# Patient Record
Sex: MALE | Race: OTHER | NOT HISPANIC OR LATINO | URBAN - METROPOLITAN AREA
[De-identification: names, ages, dates, MRNs, and addresses within clinical notes are randomized per-mention and may not be internally consistent; named-entity substitution may affect disease eponyms.]

---

## 2019-01-14 ENCOUNTER — OUTPATIENT (OUTPATIENT)
Dept: OUTPATIENT SERVICES | Facility: HOSPITAL | Age: 64
LOS: 1 days | Discharge: HOME | End: 2019-01-14
Payer: COMMERCIAL

## 2019-01-14 PROCEDURE — 93971 EXTREMITY STUDY: CPT | Mod: 26

## 2019-01-23 DIAGNOSIS — M79.601 PAIN IN RIGHT ARM: ICD-10-CM

## 2021-07-12 ENCOUNTER — EMERGENCY (EMERGENCY)
Facility: HOSPITAL | Age: 66
LOS: 0 days | Discharge: HOME | End: 2021-07-12
Attending: EMERGENCY MEDICINE | Admitting: EMERGENCY MEDICINE
Payer: MEDICARE

## 2021-07-12 VITALS
SYSTOLIC BLOOD PRESSURE: 138 MMHG | DIASTOLIC BLOOD PRESSURE: 64 MMHG | RESPIRATION RATE: 18 BRPM | OXYGEN SATURATION: 97 % | HEART RATE: 67 BPM | TEMPERATURE: 98 F | HEIGHT: 70 IN | WEIGHT: 257.94 LBS

## 2021-07-12 DIAGNOSIS — E78.5 HYPERLIPIDEMIA, UNSPECIFIED: ICD-10-CM

## 2021-07-12 DIAGNOSIS — M54.5 LOW BACK PAIN: ICD-10-CM
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
## 2021-07-12 DIAGNOSIS — E11.9 TYPE 2 DIABETES MELLITUS WITHOUT COMPLICATIONS: ICD-10-CM

## 2021-07-12 DIAGNOSIS — I10 ESSENTIAL (PRIMARY) HYPERTENSION: ICD-10-CM
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
## 2021-07-12 DIAGNOSIS — Z87.891 PERSONAL HISTORY OF NICOTINE DEPENDENCE: ICD-10-CM

## 2021-07-12 DIAGNOSIS — R30.0 DYSURIA: ICD-10-CM

## 2021-07-12 LAB
APPEARANCE UR: CLEAR — SIGNIFICANT CHANGE UP
BACTERIA # UR AUTO: NEGATIVE — SIGNIFICANT CHANGE UP
BILIRUB UR-MCNC: NEGATIVE — SIGNIFICANT CHANGE UP
COLOR SPEC: YELLOW — SIGNIFICANT CHANGE UP
DIFF PNL FLD: NEGATIVE — SIGNIFICANT CHANGE UP
EPI CELLS # UR: 4 /HPF — SIGNIFICANT CHANGE UP (ref 0–5)
GLUCOSE UR QL: SIGNIFICANT CHANGE UP
HYALINE CASTS # UR AUTO: 1 /LPF — SIGNIFICANT CHANGE UP (ref 0–7)
KETONES UR-MCNC: NEGATIVE — SIGNIFICANT CHANGE UP
LEUKOCYTE ESTERASE UR-ACNC: NEGATIVE — SIGNIFICANT CHANGE UP
NITRITE UR-MCNC: NEGATIVE — SIGNIFICANT CHANGE UP
PH UR: 6 — SIGNIFICANT CHANGE UP (ref 5–8)
PROT UR-MCNC: ABNORMAL
RBC CASTS # UR COMP ASSIST: 2 /HPF — SIGNIFICANT CHANGE UP (ref 0–4)
SP GR SPEC: 1.04 — HIGH (ref 1.01–1.03)
UROBILINOGEN FLD QL: SIGNIFICANT CHANGE UP
WBC UR QL: 4 /HPF — SIGNIFICANT CHANGE UP (ref 0–5)

## 2021-07-12 PROCEDURE — 99284 EMERGENCY DEPT VISIT MOD MDM: CPT

## 2021-07-12 RX ORDER — METHOCARBAMOL 500 MG/1
1500 TABLET, FILM COATED ORAL ONCE
Refills: 0 | Status: COMPLETED | OUTPATIENT
Start: 2021-07-12 | End: 2021-07-12

## 2021-07-12 RX ORDER — ACETAMINOPHEN 500 MG
650 TABLET ORAL ONCE
Refills: 0 | Status: COMPLETED | OUTPATIENT
Start: 2021-07-12 | End: 2021-07-12

## 2021-07-12 RX ORDER — METHOCARBAMOL 500 MG/1
2 TABLET, FILM COATED ORAL
Qty: 18 | Refills: 0
Start: 2021-07-12 | End: 2021-07-14

## 2021-07-12 RX ADMIN — Medication 650 MILLIGRAM(S): at 16:07

## 2021-07-12 RX ADMIN — METHOCARBAMOL 1500 MILLIGRAM(S): 500 TABLET, FILM COATED ORAL at 16:07

## 2021-07-12 NOTE — ED ADULT TRIAGE NOTE - CHIEF COMPLAINT QUOTE
pt states 1 week ago he developed right lower back pain. states he lifted 2 flower pots. has been having difficulty sleeping from the pain. does not radiate to hip or leg.

## 2021-07-12 NOTE — ED PROVIDER NOTE - PROGRESS NOTE DETAILS
DOROTHY: Patient reports improvement in pain. Results discussed with patient, printed copies given. Will give ortho f/u. Patient agreeable and verbalizes understanding of plan of care, f/u, and return precautions.

## 2021-07-12 NOTE — ED PROVIDER NOTE - PATIENT PORTAL LINK FT
You can access the FollowMyHealth Patient Portal offered by [facility] by registering at the following website: [email or web address]. By joining [employer]’s FollowMyHealth portal, you will also be able to view your health information using other applications (apps) compatible with our system.

## 2021-07-12 NOTE — ED PROVIDER NOTE - NSFOLLOWUPCLINICS_GEN_ALL_ED_FT
[facility] ([facility])  Rehabilitation  [facility] [address], [address] [place] [address]  Phone: [phone]  Fax:   Follow Up Time: 1-3 Days

## 2021-07-12 NOTE — ED PROVIDER NOTE - PHYSICAL EXAMINATION
VITALS:  I have reviewed the initial vital signs.  GENERAL: Well-developed, well-nourished, in no acute distress. Nontoxic. Son at bedside.  HEENT: Sclera clear. No conjunctival pallor. EOMI, PERRLA. MMM.   NECK: supple w FROM.   CARDIO: RRR, nl S1 and S2. No murmurs, rubs, or gallops.  PULM: Normal effort. CTA b/l without wheezes, rales, or rhonchi.  MSK: Normal, steady gait. No midline c, t, l spine ttp. +right SI joint ttp, no swelling, erythema, crepitus, or ecchymosis.  GI: Normal bowel sounds. Abdomen soft and non-distended. Nontender in all four quadrants without rebound or guarding.   : No CVA tenderness b/l.  SKIN: Warm, dry. No pallor. No rash. No jaundice.   NEURO: A&Ox3. Speech clear. No focal deficits.  PSYCH: Calm and cooperative.

## 2021-07-12 NOTE — ED PROVIDER NOTE - NS ED ROS FT
CONSTITUTIONAL: (-) fevers, (-) chills  ENT: (-) congestion, (-) rhinorrhea, (-) sore throat  NECK: (-) neck pain, (-) neck stiffness  CARDIO: (-) chest pain, (-) palpitations, (-) edema  PULM: (-) cough, (-) sputum, (-) chest tightness, (-) shortness of breath  GI: (-) nausea, (-) vomiting, (-) diarrhea, (-) constipation, (-) abdominal pain, (-) bowel incontinence/retention  : (-) dysuria, (-) hematuria, (-) frequency, (-) urgency, (-) flank pain, (-) incontinence, (-) difficulty urinating, (-) urinary retention  MSK: (+) back pain, (-) myalgias, (-) gait difficulty  SKIN: (-) rashes, (-) pallor, (-) itching, (-) wounds, (-) ecchymosis, (-) jaundice  NEURO: (-) weakness, (-) paresthesias, (-) numbness    *all other systems negative except as documented above and in the HPI*

## 2021-07-12 NOTE — ED PROVIDER NOTE - OBJECTIVE STATEMENT
66 year old male w hx of DM, HTN, HLD presents to the ED with constant mild non-radiating right lower back pain x 1 week. Pain began while he was lifting heavy flower pots filled with soil. Pain is worse when moving at the back, not improved with Advil. Also had one episode of burning urination. Denies fevers/chills, abd pain, n/v/d, constipation, hematuria, frequency, black/bloody stools, chest pain, sob, bowel or bladder incontinence/retention, lower extremity weakness/numbness, or saddle paresthesias.

## 2021-07-12 NOTE — ED PROVIDER NOTE - ATTENDING CONTRIBUTION TO CARE
65 y/o male h/o HTN, HLD, DM p/w rt lower back pain x approx 1 week, constant, worse with certain movements and position, better with rest, reports felt warm at onset with no documented fever, chills, paresthesias, focal weakness, bowel or bladder dysfunction, urinary sx, LE weakness, saddle anesthesia, or other associated complaints at present.     No old chart available for review.  I have reviewed and agree with the initial nursing note, except as documented in my note.    VSS, awake, alert in NAD, chest CTAB, +S1/S2, RRR, abdomen soft, NT, no pulsatile masses or bruits appreciated, no midline spinal tenderness, step-offs or deformities, no erythema, swelling or ecchymosis, no skin rash or lesions, able to dorsiflex b/l great toe, motor and sensation intact b/l LE and equal, NV intact, normal gait.

## 2021-07-12 NOTE — ED PROVIDER NOTE - CARE PROVIDER_API CALL
Blake Velazquez (MD)  Orthopaedic Surgery  [address] [place] [address]  Phone: [phone]  Fax: [phone]  Follow Up Time: 1-3 Days

## 2021-07-14 LAB
CULTURE RESULTS: SIGNIFICANT CHANGE UP
SPECIMEN SOURCE: SIGNIFICANT CHANGE UP

## 2021-10-26 ENCOUNTER — EMERGENCY (EMERGENCY)
Facility: HOSPITAL | Age: 66
LOS: 0 days | Discharge: HOME | End: 2021-10-27
Attending: EMERGENCY MEDICINE | Admitting: EMERGENCY MEDICINE
Payer: MEDICARE

## 2021-10-26 VITALS
SYSTOLIC BLOOD PRESSURE: 162 MMHG | OXYGEN SATURATION: 98 % | RESPIRATION RATE: 18 BRPM | TEMPERATURE: 97 F | HEIGHT: 70 IN | HEART RATE: 65 BPM | DIASTOLIC BLOOD PRESSURE: 74 MMHG

## 2021-10-26 DIAGNOSIS — I73.9 PERIPHERAL VASCULAR DISEASE, UNSPECIFIED: ICD-10-CM

## 2021-10-26 DIAGNOSIS — E11.9 TYPE 2 DIABETES MELLITUS WITHOUT COMPLICATIONS: ICD-10-CM

## 2021-10-26 DIAGNOSIS — N39.0 URINARY TRACT INFECTION, SITE NOT SPECIFIED: ICD-10-CM

## 2021-10-26 DIAGNOSIS — I10 ESSENTIAL (PRIMARY) HYPERTENSION: ICD-10-CM

## 2021-10-26 DIAGNOSIS — Z90.49 ACQUIRED ABSENCE OF OTHER SPECIFIED PARTS OF DIGESTIVE TRACT: ICD-10-CM

## 2021-10-26 DIAGNOSIS — N43.3 HYDROCELE, UNSPECIFIED: ICD-10-CM

## 2021-10-26 DIAGNOSIS — R93.3 ABNORMAL FINDINGS ON DIAGNOSTIC IMAGING OF OTHER PARTS OF DIGESTIVE TRACT: ICD-10-CM

## 2021-10-26 DIAGNOSIS — I25.10 ATHEROSCLEROTIC HEART DISEASE OF NATIVE CORONARY ARTERY WITHOUT ANGINA PECTORIS: ICD-10-CM

## 2021-10-26 DIAGNOSIS — K40.90 UNILATERAL INGUINAL HERNIA, WITHOUT OBSTRUCTION OR GANGRENE, NOT SPECIFIED AS RECURRENT: ICD-10-CM

## 2021-10-26 DIAGNOSIS — R10.31 RIGHT LOWER QUADRANT PAIN: ICD-10-CM

## 2021-10-26 LAB
ALBUMIN SERPL ELPH-MCNC: 4.4 G/DL — SIGNIFICANT CHANGE UP (ref 3.5–5.2)
ALP SERPL-CCNC: 90 U/L — SIGNIFICANT CHANGE UP (ref 30–115)
ALT FLD-CCNC: 26 U/L — SIGNIFICANT CHANGE UP (ref 0–41)
ANION GAP SERPL CALC-SCNC: 12 MMOL/L — SIGNIFICANT CHANGE UP (ref 7–14)
APPEARANCE UR: ABNORMAL
AST SERPL-CCNC: 19 U/L — SIGNIFICANT CHANGE UP (ref 0–41)
BACTERIA # UR AUTO: ABNORMAL
BILIRUB SERPL-MCNC: 0.3 MG/DL — SIGNIFICANT CHANGE UP (ref 0.2–1.2)
BILIRUB UR-MCNC: ABNORMAL
BUN SERPL-MCNC: 16 MG/DL — SIGNIFICANT CHANGE UP (ref 10–20)
CALCIUM SERPL-MCNC: 9.6 MG/DL — SIGNIFICANT CHANGE UP (ref 8.5–10.1)
CHLORIDE SERPL-SCNC: 102 MMOL/L — SIGNIFICANT CHANGE UP (ref 98–110)
CO2 SERPL-SCNC: 26 MMOL/L — SIGNIFICANT CHANGE UP (ref 17–32)
COLOR SPEC: YELLOW — SIGNIFICANT CHANGE UP
CREAT SERPL-MCNC: 0.8 MG/DL — SIGNIFICANT CHANGE UP (ref 0.7–1.5)
DIFF PNL FLD: NEGATIVE — SIGNIFICANT CHANGE UP
EPI CELLS # UR: 11 /HPF — HIGH (ref 0–5)
GLUCOSE SERPL-MCNC: 177 MG/DL — HIGH (ref 70–99)
GLUCOSE UR QL: SIGNIFICANT CHANGE UP
HCT VFR BLD CALC: 40.7 % — LOW (ref 42–52)
HGB BLD-MCNC: 13.5 G/DL — LOW (ref 14–18)
HYALINE CASTS # UR AUTO: 10 /LPF — HIGH (ref 0–7)
KETONES UR-MCNC: SIGNIFICANT CHANGE UP
LACTATE SERPL-SCNC: 2.3 MMOL/L — HIGH (ref 0.7–2)
LEUKOCYTE ESTERASE UR-ACNC: ABNORMAL
MCHC RBC-ENTMCNC: 29.8 PG — SIGNIFICANT CHANGE UP (ref 27–31)
MCHC RBC-ENTMCNC: 33.2 G/DL — SIGNIFICANT CHANGE UP (ref 32–37)
MCV RBC AUTO: 89.8 FL — SIGNIFICANT CHANGE UP (ref 80–94)
NITRITE UR-MCNC: NEGATIVE — SIGNIFICANT CHANGE UP
NRBC # BLD: 0 /100 WBCS — SIGNIFICANT CHANGE UP (ref 0–0)
PH UR: 5.5 — SIGNIFICANT CHANGE UP (ref 5–8)
PLATELET # BLD AUTO: 192 K/UL — SIGNIFICANT CHANGE UP (ref 130–400)
POTASSIUM SERPL-MCNC: 4.7 MMOL/L — SIGNIFICANT CHANGE UP (ref 3.5–5)
POTASSIUM SERPL-SCNC: 4.7 MMOL/L — SIGNIFICANT CHANGE UP (ref 3.5–5)
PROT SERPL-MCNC: 7.3 G/DL — SIGNIFICANT CHANGE UP (ref 6–8)
PROT UR-MCNC: ABNORMAL
RBC # BLD: 4.53 M/UL — LOW (ref 4.7–6.1)
RBC # FLD: 13.2 % — SIGNIFICANT CHANGE UP (ref 11.5–14.5)
RBC CASTS # UR COMP ASSIST: 2 /HPF — SIGNIFICANT CHANGE UP (ref 0–4)
SODIUM SERPL-SCNC: 140 MMOL/L — SIGNIFICANT CHANGE UP (ref 135–146)
SP GR SPEC: 1.03 — HIGH (ref 1.01–1.03)
UROBILINOGEN FLD QL: ABNORMAL
WBC # BLD: 7.48 K/UL — SIGNIFICANT CHANGE UP (ref 4.8–10.8)
WBC # FLD AUTO: 7.48 K/UL — SIGNIFICANT CHANGE UP (ref 4.8–10.8)
WBC UR QL: 8 /HPF — HIGH (ref 0–5)

## 2021-10-26 PROCEDURE — 76870 US EXAM SCROTUM: CPT | Mod: 26

## 2021-10-26 PROCEDURE — 74177 CT ABD & PELVIS W/CONTRAST: CPT | Mod: 26,MA

## 2021-10-26 PROCEDURE — 99285 EMERGENCY DEPT VISIT HI MDM: CPT

## 2021-10-26 RX ORDER — IBUPROFEN 200 MG
600 TABLET ORAL ONCE
Refills: 0 | Status: COMPLETED | OUTPATIENT
Start: 2021-10-26 | End: 2021-10-26

## 2021-10-26 RX ADMIN — Medication 600 MILLIGRAM(S): at 16:41

## 2021-10-26 NOTE — ED PROVIDER NOTE - ATTENDING CONTRIBUTION TO CARE
65 yo M pm hof CAD, PVD, DM, HTN, apendectomy, right inguinal hernia repair presents with scrotal pain. States that for the last few days he has been having scrotal pain that worsened today. Pain worse with walking. no abdominal pain. no n/v/d, no urinary symptoms. no fevers. no similar episodes in the past. pmd is in [place].     CONSTITUTIONAL: Well-developed; well-nourished; in no acute distress.   SKIN: warm, dry  HEAD: Normocephalic; atraumatic.  EYES: PERRL, EOMI, no conjunctival erythema  ENT: No nasal discharge; airway clear.  NECK: Supple; non tender.  CARD: S1, S2 normal;  Regular rate and rhythm.   RESP: No wheezes, rales or rhonchi.  ABD: soft non tender, non distended, no rebound or guarding.   EXT: Normal ROM.  5/5 strength in all 4 extremities   LYMPH: No acute cervical adenopathy.  NEURO: Alert, oriented, grossly unremarkable. neurovascularly intact  PSYCH: Cooperative, appropriate. 65 yo M pm hof CAD, PVD, DM, HTN, apendectomy, right inguinal hernia repair presents with scrotal pain. States that for the last few days he has been having scrotal pain that worsened today. Pain worse with walking. no abdominal pain. no n/v/d, no urinary symptoms. no fevers. no similar episodes in the past. pmd is in [place].     CONSTITUTIONAL: Well-developed; well-nourished; in no acute distress.   SKIN: warm, dry  HEAD: Normocephalic; atraumatic.  EYES: PERRL, EOMI, no conjunctival erythema  ENT: No nasal discharge; airway clear.  NECK: Supple; non tender.  CARD: S1, S2 normal;  Regular rate and rhythm.   RESP: No wheezes, rales or rhonchi.  ABD: soft non tender, non distended, no rebound or guarding.   : b/l nl testes, nl scrotum, mild tenderness just posterior to scrotal area, no erythema, no lesions  EXT: Normal ROM.  5/5 strength in all 4 extremities   LYMPH: No acute cervical adenopathy.  NEURO: Alert, oriented, grossly unremarkable. neurovascularly intact  PSYCH: Cooperative, appropriate.

## 2021-10-26 NOTE — ED PROVIDER NOTE - PHYSICAL EXAMINATION
CONST: Well appearing in NAD  EYES: PERRL, EOMI, Sclera and conjunctiva clear.   ENT: No nasal discharge. Oropharynx normal appearing  NECK: Non-tender, no meningeal signs. normal ROM. supple   CARD: Normal S1 S2; Normal rate and rhythm  RESP: Equal BS B/L, No wheezes, rhonchi or rales. No distress  GI: Soft, non-tender, non-distended. no cva tenderness. normal BS  : testicles normal, no swelling or tenderness, cremesteric reflex normal. no penile discharge. no hernias or abscess noted on exam   MS: Normal ROM in all extremities. No midline spinal tenderness. pulses 2 +. no calf tenderness or swelling  SKIN: Warm, dry, no acute rashes. Good turgor  NEURO: A&Ox3, No focal deficits. CONST: Well appearing in NAD  EYES: PERRL, EOMI, Sclera and conjunctiva clear.   ENT: No nasal discharge. Oropharynx normal appearing  NECK: Non-tender, no meningeal signs. normal ROM. supple   CARD: Normal S1 S2; Normal rate and rhythm  RESP: Equal BS B/L, No wheezes, rhonchi or rales. No distress  GI: Soft, non-tender, non-distended. no cva tenderness. normal BS  : testicles normal, no swelling or tenderness, cremasteric reflex normal. no penile discharge. no hernias or abscess noted on exam   MS: Normal ROM in all extremities. No midline spinal tenderness. pulses 2 +. no calf tenderness or swelling  SKIN: Warm, dry, no acute rashes. Good turgor  NEURO: A&Ox3, No focal deficits.

## 2021-10-26 NOTE — ED PROVIDER NOTE - PROGRESS NOTE DETAILS
patient signed out to dr. davis pending ct and reassessment. Pt s/o to me by Dr. Pugh to follow up imaging, reassess and dispo. Pt feeling much better - ambulating well in ed. eager to go home - discussed all results with pt. strict return precautions given, will follow up with pmd. Will give vascular, surgery, and urology follow up

## 2021-10-26 NOTE — ED PROVIDER NOTE - NSFOLLOWUPINSTRUCTIONS_ED_ALL_ED_FT
Hydrocele    WHAT YOU NEED TO KNOW:    A hydrocele is a collection of fluid inside the scrotum. The scrotum holds the testicles. Hydroceles are simple or communicating. A simple hydrocele stays the same size. A communicating hydrocele gets bigger and smaller as fluid flows into and out of the scrotum.    DISCHARGE INSTRUCTIONS:    Medicines:     Pain medicine:     You may need medicine to take away or decrease pain.   Learn how to take your medicine. Ask what medicine and how much you should take. Be sure you know how, when, and how often to take it.      Do not wait until the pain is severe before you take your medicine. Tell your healthcare provider if your pain does not decrease.      Pain medicine can make you dizzy or sleepy. Prevent falls by calling someone when you get out of bed or if you need help.      Take your medicine as directed. Contact your healthcare provider if you think your medicine is not helping or if you have side effects. Tell him of her if you are allergic to any medicine. Keep a list of the medicines, vitamins, and herbs you take. Include the amounts, and when and why you take them. Bring the list or the pill bottles to follow-up visits. Carry your medicine list with you in case of an emergency.    Follow up with your healthcare provider or urologist as directed: Write down your questions so you remember to ask them during your visits.     Support: You may need to wear a fabric support device similar to a jock strap to decrease swelling.    Contact your healthcare provider or urologist if:     The swelling gets worse or does not go away.      You have questions or concerns about your condition or care.    Return to the emergency department if:     You have severe pain in your scrotum.      You have a fever and your scrotum is red and swollen.      Hernia, Adult    A hernia is the bulging of an organ or tissue through a weak spot in the muscles of the abdomen (abdominal wall). Hernias develop most often near the navel or groin.    There are many kinds of hernias. Common kinds include:    Femoral hernia. This kind of hernia develops under the groin in the upper thigh area.  Inguinal hernia. This kind of hernia develops in the groin or scrotum.  Umbilical hernia. This kind of hernia develops near the navel.  Hiatal hernia. This kind of hernia causes part of the stomach to be pushed up into the chest.  Incisional hernia. This kind of hernia bulges through a scar from an abdominal surgery.     CAUSES  This condition may be caused by:    Heavy lifting.  Coughing over a long period of time.  Straining to have a bowel movement.  An incision made during an abdominal surgery.   A birth defect (congenital defect).  Excess weight or obesity.  Smoking.  Poor nutrition.  Cystic fibrosis.  Excess fluid in the abdomen.  Undescended testicles.    SYMPTOMS  Symptoms of a hernia include:    A lump on the abdomen. This is the first sign of a hernia. The lump may become more obvious with standing, straining, or coughing. It may get bigger over time if it is not treated or if the condition causing it is not treated.  Pain. A hernia is usually painless, but it may become painful over time if treatment is delayed. The pain is usually dull and may get worse with standing or lifting heavy objects.    Sometimes a hernia gets tightly squeezed in the weak spot (strangulated) or stuck there (incarcerated) and causes additional symptoms. These symptoms may include:    Vomiting.  Nausea.  Constipation.  Irritability.    DIAGNOSIS  A hernia may be diagnosed with:    A physical exam. During the exam your health care provider may ask you to cough or to make a specific movement, because a hernia is usually more visible when you move.  Imaging tests. These can include:  X-rays.  Ultrasound.  CT scan.    TREATMENT  A hernia that is small and painless may not need to be treated. A hernia that is large or painful may be treated with surgery. Inguinal hernias may be treated with surgery to prevent incarceration or strangulation. Strangulated hernias are always treated with surgery, because lack of blood to the trapped organ or tissue can cause it to die.    Surgery to treat a hernia involves pushing the bulge back into place and repairing the weak part of the abdomen.    HOME CARE INSTRUCTIONS  Avoid straining.  Do not lift anything heavier than 10 lb (4.5 kg).  Lift with your leg muscles, not your back muscles. This helps avoid strain.  When coughing, try to cough gently.  Prevent constipation. Constipation leads to straining with bowel movements, which can make a hernia worse or cause a hernia repair to break down. You can prevent constipation by:  Eating a high-fiber diet that includes plenty of fruits and vegetables.  Drinking enough fluids to keep your urine clear or pale yellow. Aim to drink 6–8 glasses of water per day.  Using a stool softener as directed by your health care provider.  Lose weight, if you are overweight.  Do not use any tobacco products, including cigarettes, chewing tobacco, or electronic cigarettes. If you need help quitting, ask your health care provider.  Keep all follow-up visits as directed by your health care provider. This is important. Your health care provider may need to monitor your condition.    SEEK MEDICAL CARE IF:  You have swelling, redness, and pain in the affected area.  Your bowel habits change.    SEEK IMMEDIATE MEDICAL CARE IF:  You have a fever.  You have abdominal pain that is getting worse.  You feel nauseous or you vomit.  You cannot push the hernia back in place by gently pressing on it while you are lying down.  The hernia:  Changes in shape or size.  Is stuck outside the abdomen.  Becomes discolored.  Feels hard or tender.    ADDITIONAL NOTES AND INSTRUCTIONS    Please follow up with your Primary MD in 24-48 hr.  Seek immediate medical care for any new/worsening signs or symptoms. Hydrocele    WHAT YOU NEED TO KNOW:    A hydrocele is a collection of fluid inside the scrotum. The scrotum holds the testicles. Hydroceles are simple or communicating. A simple hydrocele stays the same size. A communicating hydrocele gets bigger and smaller as fluid flows into and out of the scrotum.    DISCHARGE INSTRUCTIONS:    Medicines:     Pain medicine:     You may need medicine to take away or decrease pain.   Learn how to take your medicine. Ask what medicine and how much you should take. Be sure you know how, when, and how often to take it.      Do not wait until the pain is severe before you take your medicine. Tell your healthcare provider if your pain does not decrease.      Pain medicine can make you dizzy or sleepy. Prevent falls by calling someone when you get out of bed or if you need help.      Take your medicine as directed. Contact your healthcare provider if you think your medicine is not helping or if you have side effects. Tell him of her if you are allergic to any medicine. Keep a list of the medicines, vitamins, and herbs you take. Include the amounts, and when and why you take them. Bring the list or the pill bottles to follow-up visits. Carry your medicine list with you in case of an emergency.    Follow up with your healthcare provider or urologist as directed: Write down your questions so you remember to ask them during your visits.     Support: You may need to wear a fabric support device similar to a jock strap to decrease swelling.    Contact your healthcare provider or urologist if:     The swelling gets worse or does not go away.      You have questions or concerns about your condition or care.    Return to the emergency department if:     You have severe pain in your scrotum.      You have a fever and your scrotum is red and swollen.      Hernia, Adult    A hernia is the bulging of an organ or tissue through a weak spot in the muscles of the abdomen (abdominal wall). Hernias develop most often near the navel or groin.    There are many kinds of hernias. Common kinds include:    Femoral hernia. This kind of hernia develops under the groin in the upper thigh area.  Inguinal hernia. This kind of hernia develops in the groin or scrotum.  Umbilical hernia. This kind of hernia develops near the navel.  Hiatal hernia. This kind of hernia causes part of the stomach to be pushed up into the chest.  Incisional hernia. This kind of hernia bulges through a scar from an abdominal surgery.     CAUSES  This condition may be caused by:    Heavy lifting.  Coughing over a long period of time.  Straining to have a bowel movement.  An incision made during an abdominal surgery.   A birth defect (congenital defect).  Excess weight or obesity.  Smoking.  Poor nutrition.  Cystic fibrosis.  Excess fluid in the abdomen.  Undescended testicles.    SYMPTOMS  Symptoms of a hernia include:    A lump on the abdomen. This is the first sign of a hernia. The lump may become more obvious with standing, straining, or coughing. It may get bigger over time if it is not treated or if the condition causing it is not treated.  Pain. A hernia is usually painless, but it may become painful over time if treatment is delayed. The pain is usually dull and may get worse with standing or lifting heavy objects.    Sometimes a hernia gets tightly squeezed in the weak spot (strangulated) or stuck there (incarcerated) and causes additional symptoms. These symptoms may include:    Vomiting.  Nausea.  Constipation.  Irritability.    DIAGNOSIS  A hernia may be diagnosed with:    A physical exam. During the exam your health care provider may ask you to cough or to make a specific movement, because a hernia is usually more visible when you move.  Imaging tests. These can include:  X-rays.  Ultrasound.  CT scan.    TREATMENT  A hernia that is small and painless may not need to be treated. A hernia that is large or painful may be treated with surgery. Inguinal hernias may be treated with surgery to prevent incarceration or strangulation. Strangulated hernias are always treated with surgery, because lack of blood to the trapped organ or tissue can cause it to die.    Surgery to treat a hernia involves pushing the bulge back into place and repairing the weak part of the abdomen.    HOME CARE INSTRUCTIONS  Avoid straining.  Do not lift anything heavier than 10 lb (4.5 kg).  Lift with your leg muscles, not your back muscles. This helps avoid strain.  When coughing, try to cough gently.  Prevent constipation. Constipation leads to straining with bowel movements, which can make a hernia worse or cause a hernia repair to break down. You can prevent constipation by:  Eating a high-fiber diet that includes plenty of fruits and vegetables.  Drinking enough fluids to keep your urine clear or pale yellow. Aim to drink 6–8 glasses of water per day.  Using a stool softener as directed by your health care provider.  Lose weight, if you are overweight.  Do not use any tobacco products, including cigarettes, chewing tobacco, or electronic cigarettes. If you need help quitting, ask your health care provider.  Keep all follow-up visits as directed by your health care provider. This is important. Your health care provider may need to monitor your condition.    SEEK MEDICAL CARE IF:  You have swelling, redness, and pain in the affected area.  Your bowel habits change.    SEEK IMMEDIATE MEDICAL CARE IF:  You have a fever.  You have abdominal pain that is getting worse.  You feel nauseous or you vomit.  You cannot push the hernia back in place by gently pressing on it while you are lying down.  The hernia:  Changes in shape or size.  Is stuck outside the abdomen.  Becomes discolored.  Feels hard or tender.    ADDITIONAL NOTES AND INSTRUCTIONS    Please follow up with your Primary MD in 24-48 hr.  Seek immediate medical care for any new/worsening signs or symptoms.    Urinary Tract Infection    A urinary tract infection (UTI) is an infection of any part of the urinary tract, which includes the kidneys, ureters, bladder, and urethra. Risk factors include ignoring your need to urinate, wiping back to front if female, being an uncircumcised male, and having diabetes or a weak immune system. Symptoms include frequent urination, pain or burning with urination, foul smelling urine, cloudy urine, pain in the lower abdomen, blood in the urine, and fever. If you were prescribed an antibiotic medicine, take it as told by your health care provider. Do not stop taking the antibiotic even if you start to feel better.    SEEK IMMEDIATE MEDICAL CARE IF YOU HAVE THE FOLLOWING SYMPTOMS: severe back or abdominal pain, inability to keep fluids or medicine down, dizziness/lightheadedness, or a change in mental status.

## 2021-10-26 NOTE — ED PROVIDER NOTE - CLINICAL SUMMARY MEDICAL DECISION MAKING FREE TEXT BOX
pt evaluated for groin pain, US with small bilateral hydrocele noted, UA + leuks and CT A/P with ectatic aorta and small fat containing left inguinal hernia. all results discussed extensively with pt and copies given. pt reported feeling well and wanted to be discharged. advised close follow up with PMD and vascular and pt agreed. referrals also given for urology and surgery. rx for cefpodoxime prescribed. Strict return precautions advised and pt verbalized understanding.

## 2021-10-26 NOTE — ED PROVIDER NOTE - OBJECTIVE STATEMENT
66 year old male with pmhx of CAD, PVD, DM, HTN, appendectomy, right inguinal hernia repair presents with right groin pain since friday. Pt worse with  movement and ambulation. Pt was doing heavy lifting on Thursday. No urinary symptoms, penile discharge, abd pain, nausea, vomiting, diarrhea, fever or chills.

## 2021-10-26 NOTE — ED PROVIDER NOTE - PATIENT PORTAL LINK FT
You can access the FollowMyHealth Patient Portal offered by [facility] by registering at the following website: [email or web address]. By joining [employer]’s FollowMyHealth portal, you will also be able to view your health information using other applications (apps) compatible with our system.

## 2021-10-26 NOTE — ED ADULT NURSE NOTE - NS ED NURSE RECORD ANOTHER VITAL SIGN
CC: syncope/fall (28 Mar 2018 17:53)    HPI from ED:  82 yo male with extensive PMH of CAD s/p CABG, chronic a. fib (on coumadin), h/o Mitral valve clipping, HTN, HLD, CKD stage III, Anemia of chronic disease, h/o colonic AVM, DM2, SILVERIO, dementia, hiatal hernia presents to ED s/p multiple falls and syncope. Pt poor historian secondary to dementia and history obtained from family at bedside. Pt presented to ED on 3/27 after episode of syncope as per family and hit the back of his head on the ground. He was found to have a head laceration which was repaired with staples. CT head at the time was negative and pt was discharged home. While patient was walking into his house he had another episode of syncope which lasted a few seconds as per son. He did not hit his head this time. He then had 2 other episodes of near syncope which he felt weak but did not lose consciousness. He went to sleep downstairs on the couch and when the family woke up this morning he was found on the ground. Currently pt resting comfortably and is pleasantly confused. A+Ox2.  Denies any fevers, chills, headaches, dizziness, palpitations, chest pain, SOB, abd pain, N/V, diarrhea. (28 Mar 2018 17:53)    3/29: Pt was seen and examined, confused, Pts wife at bedside, confirmed history above, denies any recent illness, no fevers or chills, no SOB or cough, +  urinary incontinence, no dysuria. reports that Pt lost about 40LBS in past few months, states that her son now lives  with them and they are on "healthy" diet. Also she is not sure why Pt is on torsemide, but was never told about CHF or " water in the Lungs" .   Results of work up and  further POC discussed.         3/30: Pt was seen and examined with NP this am, looks more alert, know where he is, why in the hospital. Denies dizziness. As per RN No Bm. Has good appetite. Osborne placed yesterday, draining dark urine     3/31: Pt was seen and examined this am, c/o feeling tired and wants to rest. As per RN was checking Orthostatics and Pt was c/o lightheadedness on changing position from laying to sitting and didnt want to stand up. otherwise no issues. Good oral intake. Completed IVF.     Vital Signs Last 24 Hrs  T(C): 37 (31 Mar 2018 16:12), Max: 37 (31 Mar 2018 05:54)  T(F): 98.6 (31 Mar 2018 16:12), Max: 98.6 (31 Mar 2018 05:54)  HR: 62 (31 Mar 2018 16:12) (58 - 75)  BP: 119/56 (31 Mar 2018 10:24) (111/50 - 134/64)  RR: 18 (31 Mar 2018 16:12) (18 - 18)  SpO2: 94% (31 Mar 2018 16:12) (94% - 99%)      REVIEW OF SYSTEMS:  unable to obtain as Pt has dementia     PHYSICAL EXAM:  General: Well developed; malnourished; in no acute distress  Eyes: PERRLA, EOMI; conjunctiva and sclera clear  Head: Normocephalic; + laceration, repaired, no erythema, no drainage    ENMT: No nasal discharge; airway clear  Neck: Supple; non tender; no masses  Respiratory: Good air entry, No wheezes, rales or rhonchi  Cardiovascular: Regular rate and rhythm. S1 and S2 Normal; No murmurs  Gastrointestinal: Soft non-tender non-distended; Normal bowel sounds  Genitourinary: No costovertebral angle tenderness, some suprapubic fullness  Extremities: Normal range of motion, No edema  Neurological: Alert and oriented x2, non focal   Skin: Warm and dry. No acute rash  Musculoskeletal: Normal muscle  tone, without deformities  Psychiatric: Cooperative and appropriate        Labs & Radiology:                           7.9    x     )-----------( x        ( 31 Mar 2018 14:05 )             23.5     03-31    140  |  106  |  44<H>  ----------------------------<  263<H>  4.5   |  28  |  1.29    Ca    8.8      31 Mar 2018 06:24    PT/INR - ( 31 Mar 2018 06:24 )   PT: 38.7 sec;   INR: 3.49 ratio    PTT - ( 29 Mar 2018 21:13 )  PTT:32.4 sec                      8.3    6.77  )-----------( 130      ( 30 Mar 2018 06:20 )             24.2     03-30    137  |  104  |  48<H>  ----------------------------<  297<H>  4.1   |  26  |  1.35<H>    Ca    8.5      30 Mar 2018 06:20  Phos  2.5     03-29  Mg     2.8     03-29    TPro  6.3  /  Alb  3.3  /  TBili  0.8  /  DBili  x   /  AST  19  /  ALT  23  /  AlkPhos  64  03-28    PT/INR - ( 30 Mar 2018 06:20 )   PT: 28.4 sec;   INR: 2.58 ratio         PTT - ( 29 Mar 2018 21:13 )  PTT:32.4 sec    Hemoglobin A1C, Whole Blood: 7.3 % (03-29-18 @ 06:12)  Vitamin B12, Serum in AM (03.29.18 @ 06:12)    Vitamin B12, Serum: 1600: Note: Reference Range Change on 12/18/2017. pg/mL    Folate, Serum in AM (03.29.18 @ 06:12)    Folate, Serum: >20.0 ng/mL        CT Cervical Spine:  IMPRESSION:   No vertebral fracture is recognized.  Grade 1 anterior   spondylolisthesis at C6-7 on a degenerative basis.    Narrowing of the   LEFT C2-3, BILATERAL C3-4, LEFT C4-5, LEFT C5-6 and C6-7 neural foramina   due to uncovertebral spurring and facet osteophytic hypertrophy.     CT Abd/pelvis/chest:      IMPRESSION: No traumatic injury.  Markedly distended urinary bladder.   Renal ultrasound is recommended for further evaluation of the   indeterminate hyperdense left kidney lesion.      CT Head:  IMPRESSION:   Moderate periventricular and deep white matter ischemia is   unchanged.    LEFT parietal scalp hematoma is noted with skin staples.    SHOULDER XRAY:  IMPRESSION:   No prior examinations are available for review.    No fracture or dislocation is identified.  Acromioclavicular joint is   intact. Soft tissues are intact. Median sternotomy.                    MEDICATIONS  (STANDING):  cholecalciferol 2000 Unit(s) Oral daily  dextrose 5%. 1000 milliLiter(s) (50 mL/Hr) IV Continuous <Continuous>  dextrose 50% Injectable 12.5 Gram(s) IV Push once  dextrose 50% Injectable 25 Gram(s) IV Push once  dextrose 50% Injectable 25 Gram(s) IV Push once  fludroCORTISONE 0.1 milliGRAM(s) Oral daily  insulin lispro (HumaLOG) corrective regimen sliding scale   SubCutaneous three times a day before meals  insulin lispro (HumaLOG) corrective regimen sliding scale   SubCutaneous at bedtime  pantoprazole  Injectable 40 milliGRAM(s) IV Push daily  simvastatin 40 milliGRAM(s) Oral at bedtime  sodium chloride 0.9%. 1000 milliLiter(s) (50 mL/Hr) IV Continuous <Continuous>  sodium chloride 0.9%. 1000 milliLiter(s) (50 mL/Hr) IV Continuous <Continuous>    MEDICATIONS  (PRN):  acetaminophen   Tablet. 650 milliGRAM(s) Oral every 6 hours PRN Mild Pain (1 - 3)  dextrose Gel 1 Dose(s) Oral once PRN Blood Glucose LESS THAN 70 milliGRAM(s)/deciliter  docusate sodium 100 milliGRAM(s) Oral two times a day PRN Constipation  glucagon  Injectable 1 milliGRAM(s) IntraMuscular once PRN Glucose LESS THAN 70 milligrams/deciliter  senna 2 Tablet(s) Oral at bedtime PRN Constipation          HOSPITAL COURSE BY PROBLEM:  ASSESSMENT & PLAN:    # Multiple falls with syncope and near syncope  - likely  Orthostatic due to dehydration  - Ortostartic VS  improved after IVF  and neg, but Pt feels dizzy on getting up. Started on Florinef   - monitor on tele, afib 40s-70s  - CT head negative  - trop neg x 3  - echo results noted, valves stable, EF normal   - completed IVF, monitor off   - cardio consult appreciated ---> added Florinef 0.1mg QD  - PT eval      # EM on CKD stage III with uremia  - likely dehydration and obstruction   - CR down to normal levels   - hold torsemide ( as per son dose was increased in past few weeks)   - watch off  IV fluids  - c/w Osborne cath, monitor UO  - CT abd: L kidney lesion   - Renal Sono with L complex cyst. Distended bladder   - Monitor UO and Renal Fx     # Acute on Chronic Anemia of blood loss?   - baseline hgb about 12, possibly has dilutional component   - No signs of  acute bleeding   - IN EMR had EGD 3/7 with gastric  erosions  and bleeding stigmata?   - B12, folate WNL  - stool occult blood neg   - Iron panel: Iron deficiency and inflammation   - Retic count appropriate   - Monitor H/H closely  - C/w PPI  - Iron supplement   - Hem onc eval       # Chronic A. fib  - rate controlled  - INR daily, SupraTX   - Hold  coumadin    # DM2  - HgbA1c = 7.3%  - hold januvia  - ISS, finger sticks    # HLD  - continue statin    # DVT prophylaxis  - on coumadin Yes

## 2021-10-26 NOTE — ED PROVIDER NOTE - CARE PLAN
Principal Discharge DX:	Hydrocele  Secondary Diagnosis:	Inguinal hernia  Secondary Diagnosis:	Abdominal aorta finding   1 Principal Discharge DX:	Hydrocele  Secondary Diagnosis:	Inguinal hernia  Secondary Diagnosis:	Abdominal aorta finding  Secondary Diagnosis:	Acute UTI

## 2021-10-26 NOTE — ED PROVIDER NOTE - CARE PROVIDER_API CALL
Gaby Hatfield)  Urology  [address] [facility] [address]  [place] [address]  Phone: [phone]  Fax: [phone]  Follow Up Time:     Velasquez Blanton)  Surgery; Vascular Surgery  [address] [place] [address]  Phone: [phone]  Fax: [phone]  Follow Up Time:     Avani Juarez)  Surgery; Surgical Critical Care  [address] [place] [address]  Phone: [phone]  Fax: [phone]  Follow Up Time:     Marine Cross)  Surgery; Surgical Critical Care  [address] [place] [address]  Phone: [phone]  Fax: [phone]  Follow Up Time:

## 2021-10-26 NOTE — ED PROVIDER NOTE - PROVIDER TOKENS
PROVIDER:[TOKEN:[[id number]]],PROVIDER:[TOKEN:[[id number]]],PROVIDER:[TOKEN:[[id number]]],PROVIDER:[TOKEN:[[id number]]]

## 2021-10-26 NOTE — ED PROVIDER NOTE - CARE PROVIDERS DIRECT ADDRESSES
[email or web address],DirectAddress_Unknown,DirectAddress_Unknown

## 2021-10-27 VITALS
HEART RATE: 69 BPM | RESPIRATION RATE: 18 BRPM | OXYGEN SATURATION: 99 % | DIASTOLIC BLOOD PRESSURE: 66 MMHG | SYSTOLIC BLOOD PRESSURE: 151 MMHG | TEMPERATURE: 97 F

## 2021-10-27 RX ORDER — CEFPODOXIME PROXETIL 100 MG
1 TABLET ORAL
Qty: 20 | Refills: 0
Start: 2021-10-27 | End: 2021-11-05

## 2021-10-28 LAB
CULTURE RESULTS: SIGNIFICANT CHANGE UP
SPECIMEN SOURCE: SIGNIFICANT CHANGE UP

## 2022-12-26 ENCOUNTER — INPATIENT (INPATIENT)
Facility: HOSPITAL | Age: 67
LOS: 1 days | Discharge: HOME | End: 2022-12-28
Attending: INTERNAL MEDICINE | Admitting: INTERNAL MEDICINE
Payer: MEDICARE

## 2022-12-26 VITALS
RESPIRATION RATE: 22 BRPM | SYSTOLIC BLOOD PRESSURE: 130 MMHG | TEMPERATURE: 100 F | DIASTOLIC BLOOD PRESSURE: 61 MMHG | WEIGHT: 251.99 LBS | HEART RATE: 88 BPM | OXYGEN SATURATION: 93 % | HEIGHT: 68 IN

## 2022-12-26 LAB
ALBUMIN SERPL ELPH-MCNC: 4.6 G/DL — SIGNIFICANT CHANGE UP (ref 3.5–5.2)
ALP SERPL-CCNC: 91 U/L — SIGNIFICANT CHANGE UP (ref 30–115)
ALT FLD-CCNC: 13 U/L — SIGNIFICANT CHANGE UP (ref 0–41)
ANION GAP SERPL CALC-SCNC: 15 MMOL/L — HIGH (ref 7–14)
AST SERPL-CCNC: 13 U/L — SIGNIFICANT CHANGE UP (ref 0–41)
BASOPHILS # BLD AUTO: 0.03 K/UL — SIGNIFICANT CHANGE UP (ref 0–0.2)
BASOPHILS NFR BLD AUTO: 0.2 % — SIGNIFICANT CHANGE UP (ref 0–1)
BILIRUB SERPL-MCNC: 0.5 MG/DL — SIGNIFICANT CHANGE UP (ref 0.2–1.2)
BUN SERPL-MCNC: 24 MG/DL — HIGH (ref 10–20)
CALCIUM SERPL-MCNC: 9.3 MG/DL — SIGNIFICANT CHANGE UP (ref 8.4–10.5)
CHLORIDE SERPL-SCNC: 97 MMOL/L — LOW (ref 98–110)
CO2 SERPL-SCNC: 26 MMOL/L — SIGNIFICANT CHANGE UP (ref 17–32)
CREAT SERPL-MCNC: 1 MG/DL — SIGNIFICANT CHANGE UP (ref 0.7–1.5)
EGFR: 81 ML/MIN/1.73M2 — SIGNIFICANT CHANGE UP
EOSINOPHIL # BLD AUTO: 0.11 K/UL — SIGNIFICANT CHANGE UP (ref 0–0.7)
EOSINOPHIL NFR BLD AUTO: 0.9 % — SIGNIFICANT CHANGE UP (ref 0–8)
FLUAV AG NPH QL: SIGNIFICANT CHANGE UP
FLUBV AG NPH QL: SIGNIFICANT CHANGE UP
GLUCOSE BLDC GLUCOMTR-MCNC: 163 MG/DL — HIGH (ref 70–99)
GLUCOSE SERPL-MCNC: 155 MG/DL — HIGH (ref 70–99)
HCT VFR BLD CALC: 37.7 % — LOW (ref 42–52)
HGB BLD-MCNC: 12.6 G/DL — LOW (ref 14–18)
IMM GRANULOCYTES NFR BLD AUTO: 0.3 % — SIGNIFICANT CHANGE UP (ref 0.1–0.3)
LYMPHOCYTES # BLD AUTO: 1.21 K/UL — SIGNIFICANT CHANGE UP (ref 1.2–3.4)
LYMPHOCYTES # BLD AUTO: 10 % — LOW (ref 20.5–51.1)
MAGNESIUM SERPL-MCNC: 2 MG/DL — SIGNIFICANT CHANGE UP (ref 1.8–2.4)
MCHC RBC-ENTMCNC: 29.7 PG — SIGNIFICANT CHANGE UP (ref 27–31)
MCHC RBC-ENTMCNC: 33.4 G/DL — SIGNIFICANT CHANGE UP (ref 32–37)
MCV RBC AUTO: 88.9 FL — SIGNIFICANT CHANGE UP (ref 80–94)
MONOCYTES # BLD AUTO: 1.66 K/UL — HIGH (ref 0.1–0.6)
MONOCYTES NFR BLD AUTO: 13.7 % — HIGH (ref 1.7–9.3)
NEUTROPHILS # BLD AUTO: 9.06 K/UL — HIGH (ref 1.4–6.5)
NEUTROPHILS NFR BLD AUTO: 74.9 % — SIGNIFICANT CHANGE UP (ref 42.2–75.2)
NRBC # BLD: 0 /100 WBCS — SIGNIFICANT CHANGE UP (ref 0–0)
NT-PROBNP SERPL-SCNC: 485 PG/ML — HIGH (ref 0–300)
PLATELET # BLD AUTO: 217 K/UL — SIGNIFICANT CHANGE UP (ref 130–400)
POTASSIUM SERPL-MCNC: 3.9 MMOL/L — SIGNIFICANT CHANGE UP (ref 3.5–5)
POTASSIUM SERPL-SCNC: 3.9 MMOL/L — SIGNIFICANT CHANGE UP (ref 3.5–5)
PROT SERPL-MCNC: 6.8 G/DL — SIGNIFICANT CHANGE UP (ref 6–8)
RBC # BLD: 4.24 M/UL — LOW (ref 4.7–6.1)
RBC # FLD: 13.8 % — SIGNIFICANT CHANGE UP (ref 11.5–14.5)
RSV RNA NPH QL NAA+NON-PROBE: SIGNIFICANT CHANGE UP
SARS-COV-2 RNA SPEC QL NAA+PROBE: SIGNIFICANT CHANGE UP
SODIUM SERPL-SCNC: 138 MMOL/L — SIGNIFICANT CHANGE UP (ref 135–146)
TROPONIN T SERPL-MCNC: <0.01 NG/ML — SIGNIFICANT CHANGE UP
WBC # BLD: 12.11 K/UL — HIGH (ref 4.8–10.8)
WBC # FLD AUTO: 12.11 K/UL — HIGH (ref 4.8–10.8)

## 2022-12-26 PROCEDURE — 99285 EMERGENCY DEPT VISIT HI MDM: CPT | Mod: FS,CS

## 2022-12-26 PROCEDURE — 71045 X-RAY EXAM CHEST 1 VIEW: CPT | Mod: 26

## 2022-12-26 PROCEDURE — 93010 ELECTROCARDIOGRAM REPORT: CPT

## 2022-12-26 RX ORDER — ENOXAPARIN SODIUM 100 MG/ML
40 INJECTION SUBCUTANEOUS EVERY 24 HOURS
Refills: 0 | Status: DISCONTINUED | OUTPATIENT
Start: 2022-12-26 | End: 2022-12-28

## 2022-12-26 RX ORDER — ATORVASTATIN CALCIUM 80 MG/1
80 TABLET, FILM COATED ORAL AT BEDTIME
Refills: 0 | Status: DISCONTINUED | OUTPATIENT
Start: 2022-12-26 | End: 2022-12-28

## 2022-12-26 RX ORDER — CEFTRIAXONE 500 MG/1
1000 INJECTION, POWDER, FOR SOLUTION INTRAMUSCULAR; INTRAVENOUS ONCE
Refills: 0 | Status: COMPLETED | OUTPATIENT
Start: 2022-12-26 | End: 2022-12-26

## 2022-12-26 RX ORDER — IPRATROPIUM/ALBUTEROL SULFATE 18-103MCG
3 AEROSOL WITH ADAPTER (GRAM) INHALATION
Refills: 0 | Status: COMPLETED | OUTPATIENT
Start: 2022-12-26 | End: 2022-12-26

## 2022-12-26 RX ORDER — PANTOPRAZOLE SODIUM 20 MG/1
40 TABLET, DELAYED RELEASE ORAL
Refills: 0 | Status: DISCONTINUED | OUTPATIENT
Start: 2022-12-26 | End: 2022-12-28

## 2022-12-26 RX ORDER — AMLODIPINE BESYLATE 2.5 MG/1
10 TABLET ORAL DAILY
Refills: 0 | Status: DISCONTINUED | OUTPATIENT
Start: 2022-12-26 | End: 2022-12-28

## 2022-12-26 RX ORDER — AZITHROMYCIN 500 MG/1
500 TABLET, FILM COATED ORAL ONCE
Refills: 0 | Status: COMPLETED | OUTPATIENT
Start: 2022-12-26 | End: 2022-12-26

## 2022-12-26 RX ORDER — ASPIRIN/CALCIUM CARB/MAGNESIUM 324 MG
81 TABLET ORAL DAILY
Refills: 0 | Status: DISCONTINUED | OUTPATIENT
Start: 2022-12-26 | End: 2022-12-28

## 2022-12-26 RX ORDER — IPRATROPIUM/ALBUTEROL SULFATE 18-103MCG
3 AEROSOL WITH ADAPTER (GRAM) INHALATION EVERY 6 HOURS
Refills: 0 | Status: DISCONTINUED | OUTPATIENT
Start: 2022-12-26 | End: 2022-12-28

## 2022-12-26 RX ORDER — CIPROFLOXACIN HCL 0.3 %
2 DROPS OPHTHALMIC (EYE) EVERY 6 HOURS
Refills: 0 | Status: DISCONTINUED | OUTPATIENT
Start: 2022-12-26 | End: 2022-12-28

## 2022-12-26 RX ORDER — PRASUGREL 5 MG/1
5 TABLET, FILM COATED ORAL DAILY
Refills: 0 | Status: DISCONTINUED | OUTPATIENT
Start: 2022-12-26 | End: 2022-12-28

## 2022-12-26 RX ORDER — CARVEDILOL PHOSPHATE 80 MG/1
25 CAPSULE, EXTENDED RELEASE ORAL EVERY 12 HOURS
Refills: 0 | Status: DISCONTINUED | OUTPATIENT
Start: 2022-12-26 | End: 2022-12-28

## 2022-12-26 RX ORDER — AZITHROMYCIN 500 MG/1
500 TABLET, FILM COATED ORAL EVERY 24 HOURS
Refills: 0 | Status: DISCONTINUED | OUTPATIENT
Start: 2022-12-27 | End: 2022-12-28

## 2022-12-26 RX ORDER — CEFTRIAXONE 500 MG/1
1000 INJECTION, POWDER, FOR SOLUTION INTRAMUSCULAR; INTRAVENOUS EVERY 24 HOURS
Refills: 0 | Status: DISCONTINUED | OUTPATIENT
Start: 2022-12-27 | End: 2022-12-28

## 2022-12-26 RX ADMIN — Medication 3 MILLILITER(S): at 11:26

## 2022-12-26 RX ADMIN — Medication 3 MILLILITER(S): at 20:31

## 2022-12-26 RX ADMIN — CEFTRIAXONE 100 MILLIGRAM(S): 500 INJECTION, POWDER, FOR SOLUTION INTRAMUSCULAR; INTRAVENOUS at 15:14

## 2022-12-26 RX ADMIN — Medication 3 MILLILITER(S): at 11:31

## 2022-12-26 RX ADMIN — ATORVASTATIN CALCIUM 80 MILLIGRAM(S): 80 TABLET, FILM COATED ORAL at 22:59

## 2022-12-26 RX ADMIN — CARVEDILOL PHOSPHATE 25 MILLIGRAM(S): 80 CAPSULE, EXTENDED RELEASE ORAL at 17:19

## 2022-12-26 RX ADMIN — Medication 2 DROP(S): at 23:07

## 2022-12-26 RX ADMIN — Medication 2 DROP(S): at 17:19

## 2022-12-26 RX ADMIN — Medication 3 MILLILITER(S): at 11:37

## 2022-12-26 RX ADMIN — AZITHROMYCIN 255 MILLIGRAM(S): 500 TABLET, FILM COATED ORAL at 15:15

## 2022-12-26 NOTE — ED PROVIDER NOTE - ATTENDING APP SHARED VISIT CONTRIBUTION OF CARE
68 yo m hx htn, hld, cad s/p pci x7 (Dr. Hanson at [place])  pt presents for eval of flu-like sx. + cough, fever, wheezing, decreased PO intake, myalgias. + pain to chest with coughing. pt went to uc today and was low 90s on RA and febrile.  pt was ref to ED    vs febrile  gen- NAD, aaox3  card-rrr  lungs- no resp distress, mild tachypniae, b/l wheezing  abd-sntnd, no guarding or rebound  neuro- full str/sensation, cn ii-xii grossly intact, normal coordination  LE- symmetric 1+ LE edema    will get screening labs, assess, for pna vs flu/covid, less likely cardiac but given hx will get screening ekg/trop/bnp  will provide supportive care, serial exam and ED observation period

## 2022-12-26 NOTE — H&P ADULT - NSHPPHYSICALEXAM_GEN_ALL_CORE
General:   Lungs:   Heart:   Abdomen:   LE: General: NAD  HEENT: Left eye is red with purulent discharge.  Lungs: GBAE  Heart: RRR, normal s1s2  Abdomen: +BS, soft, nontender  LE: Bilateral ZEHRA, with bilateral lower extremity bronzing of the skin ( Venous insufficiency?)

## 2022-12-26 NOTE — ED PROVIDER NOTE - NS ED ROS FT
Constitutional: (-) fever, (-) chills  Eyes: (-) visual changes  ENT: (-) nasal congestions  Cardiovascular: (-) chest pain, (-) syncope  Respiratory: (+) cough, (+) shortness of breath, (+) dyspnea,   Gastrointestinal: (-) vomiting, (-) diarrhea, (-)nausea,  Musculoskeletal: (-) neck pain, (-) back pain, (-) joint pain,  Integumentary: (-) rash, (-) edema, (-) bruises  Neurological: (-) headache, (-) loc, (-) dizziness, (-) tingling, (-)numbness,  Peripheral Vascular: (-) leg swelling  :  (-)dysuria,  (-) hematuria

## 2022-12-26 NOTE — ED ADULT TRIAGE NOTE - HEIGHT IN CM
· Take full course of antibiotic  · It is OK to treat fever when it is >101 and bothers your child  · Rest; they can play in the house but keep them home for a few days  · Drink plenty of fluids; warm herbal tea with honey is especially helpful for cough 172.72

## 2022-12-26 NOTE — H&P ADULT - ASSESSMENT
#Hypoxia:   #Dyspnea/SOB  #Suspected CAP  - 92-93% on RA, increased to 94%-95% on 2L/min by NC  - Current Oxygen requirements  - Chest Xray: My assessment: Right lower lobe opacity  - Leukocytosis, cough and CXR opacity --> treat CAP (Ceftriaxone and Azithromycin for now)  - Blood cultures, Sputum Cultures, Legionella Ag in the urine, Streptococcus Ag in the urine  - BNP: 485pg/mL  - Troponin: <0.01  - Obtain EKG    #CAD:   - s/p PCI ( 7 stents ?), last in [place], in July 2022    #HTN:       #HLD    #Misc:   - DVT proph:   - GI proph:   - Diet:    67-year-old, M, with the aforementioned PMHx, presents with shortness of breath and cough     #Dyspnea/SOB  #Suspected CAP  - 92-93% on RA, increased to 94%-95% on 2L/min by NC  - Current Oxygen requirements: 94% on RA  - Chest Xray: My assessment: Right lower lobe opacity  - Leukocytosis, cough and CXR opacity --> treat CAP (Ceftriaxone and Azithromycin for now)  - Blood cultures, Sputum Cultures, Legionella Ag in the urine, Streptococcus Ag in the urine  - Follow up RVP  - BNP: 485pg/mL  - Troponin: <0.01  - Obtain EKG for QTc  - C/w Ceftriaxone and Azithromycin  - C/w Duonebs  - Start Tamiflu 75mg po q12hrs, DC if RVP is negative    #Left Red Eye   #Suspected Bacterial Conjunctivitis  - C/w Topical Antibiotic Eye drops  - Possible Non-typable H. influenzae causing conjunctivitis and pneumonia as well    #CAD:   - s/p PCI ( 7 stents ?), last in [place], in July 2022  - Carvedilol 25mg po q12hrs  - C/w Aspirin 81mg po once daily  - C/w Prasugrel 5mg po once daily  - C/w Losartan/HCTZ 100/25mg po once daily    #HTN:   - C/w Amlodipine 10mg po once daily   - Carvedilol 25mg po q12hrs  - C/w Losartan/HCTZ 100/25mg po once daily    #Type II DM:   - A1c  - Fingersticks pre- meals and fasting  -  Hold Metformin for now  - Insulin if FS >180    #HLD:   - C/w Rosuvastatin 20mg po once daily at bedtime    #Misc:   - DVT proph: Lovenox 40mg SubQ once daily  - GI proph: Pantoprazole 40mg po once daily  - Diet: DASH/TLC and CC   67-year-old, M, with the aforementioned PMHx, presents with shortness of breath and cough     #Dyspnea/SOB  #Suspected CAP  - 92-93% on RA, increased to 94%-95% on 2L/min by NC  - Current Oxygen requirements: 94% on RA  - Chest Xray: My assessment: Right lower lobe opacity  - Leukocytosis, cough and CXR opacity --> treat CAP (Ceftriaxone and Azithromycin for now)  - Blood cultures, Sputum Cultures, Legionella Ag in the urine, Streptococcus Ag in the urine  - RVP: negative  - BNP: 485pg/mL  - Troponin: <0.01  - Obtain EKG for QTc  - C/w Ceftriaxone and Azithromycin  - C/w Duonebs      #Left Red Eye   #Suspected Bacterial Conjunctivitis  - C/w Topical Antibiotic Eye drops  - Possible Non-typable H. influenzae causing conjunctivitis and pneumonia as well    #CAD:   - s/p PCI ( 7 stents ?), last in [place], in July 2022  - Carvedilol 25mg po q12hrs  - C/w Aspirin 81mg po once daily  - C/w Prasugrel 5mg po once daily  - C/w Losartan/HCTZ 100/25mg po once daily    #HTN:   - C/w Amlodipine 10mg po once daily   - Carvedilol 25mg po q12hrs  - C/w Losartan/HCTZ 100/25mg po once daily    #Type II DM:   - A1c  - Fingersticks pre- meals and fasting  -  Hold Metformin for now  - Insulin if FS >180    #HLD:   - C/w Rosuvastatin 20mg po once daily at bedtime    #Misc:   - DVT proph: Lovenox 40mg SubQ once daily  - GI proph: Pantoprazole 40mg po once daily  - Diet: DASH/TLC and CC   67-year-old, M, with the aforementioned PMHx, presents with shortness of breath and cough     #Dyspnea/SOB  #Suspected CAP  - 92-93% on RA, increased to 94%-95% on 2L/min by NC  - Current Oxygen requirements: 94% on RA  - Chest Xray: My assessment: Right lower lobe opacity  - Leukocytosis, cough and CXR opacity --> treat CAP (Ceftriaxone and Azithromycin for now)  - Blood cultures, Sputum Cultures, Legionella Ag in the urine, Streptococcus Ag in the urine  - RVP: negative  - BNP: 485pg/mL  - Troponin: <0.01  - Obtain EKG for QTc  - C/w Ceftriaxone and Azithromycin  - C/w Duonebs      #Left Red Eye   #Suspected Bacterial Conjunctivitis  - C/w Topical Antibiotic Eye drops  - Possible Non-typable H. influenzae causing conjunctivitis and pneumonia as well    #CAD:   - s/p PCI ( 7 stents ?), last in [place], in July 2022  - Carvedilol 25mg po q12hrs  - C/w Aspirin 81mg po once daily  - C/w Prasugrel 5mg po once daily  - C/w Losartan/HCTZ 100/25mg po once daily    #HTN:   - C/w Amlodipine 10mg po once daily   - Carvedilol 25mg po q12hrs  - C/w Losartan/HCTZ 100/25mg po once daily    #Type II DM:   - A1c  - Fingersticks pre- meals and fasting  -  Hold Metformin for now  - Insulin if FS >180    #HLD:   - C/w Rosuvastatin 20mg po once daily at bedtime    #Misc:   - DVT proph: Lovenox 40mg SubQ once daily  - GI proph: Pantoprazole 40mg po once daily  - Diet: DASH/TLC and CC  - Updated the patient's wife ( Joan) on the phone number: [phone].    67-year-old, M, with the aforementioned PMHx, presents with shortness of breath and cough     #Dyspnea/SOB  #Suspected CAP  - 92-93% on RA, increased to 94%-95% on 2L/min by NC  - Current Oxygen requirements: 94% on RA  - Chest Xray: My assessment: Right lower lobe opacity  - Leukocytosis, cough and CXR opacity --> treat CAP (Ceftriaxone and Azithromycin for now)  - Blood cultures, Sputum Cultures, Legionella Ag in the urine, Streptococcus Ag in the urine  - RVP: negative  - BNP: 485pg/mL. ( Underestimated due to patient's body habitus)  - Follow up TTE  - Troponin: <0.01  - Obtain EKG for QTc  - C/w Ceftriaxone and Azithromycin  - C/w Duonebs      #Left Red Eye   #Suspected Bacterial Conjunctivitis  - C/w Topical Antibiotic Eye drops  - Possible Non-typable H. influenzae causing conjunctivitis and pneumonia as well    #CAD:   - s/p PCI ( 7 stents ?), last in [place], in July 2022  - Carvedilol 25mg po q12hrs  - C/w Aspirin 81mg po once daily  - C/w Prasugrel 5mg po once daily  - C/w Losartan/HCTZ 100/25mg po once daily    #HTN:   - C/w Amlodipine 10mg po once daily   - Carvedilol 25mg po q12hrs  - C/w Losartan/HCTZ 100/25mg po once daily    #Type II DM:   - A1c  - Fingersticks pre- meals and fasting  -  Hold Metformin for now  - Insulin if FS >180    #HLD:   - C/w Rosuvastatin 20mg po once daily at bedtime    #Misc:   - DVT proph: Lovenox 40mg SubQ once daily  - GI proph: Pantoprazole 40mg po once daily  - Diet: DASH/TLC and CC  - Updated the patient's wife ( Joan) on the phone number: [phone].

## 2022-12-26 NOTE — ED ADULT NURSE NOTE - CHIEF COMPLAINT QUOTE
Cough, shortness of breath & chest pain x 4 days. Evaluated @ an urgent care center PTA, found to have fever 100.8 F, wheezing, 02 Sat 92% RA, advised by [facility] staff to come to ER for further evaluation & treatment.

## 2022-12-26 NOTE — H&P ADULT - HISTORY OF PRESENT ILLNESS
67-year-old, M, PMHx: HTN, HLD, CAD, 67-year-old, M, PMHx: HTN, DM,  HLD, CAD (s/p 7 stents last in July 2022). Presented to the hospital for cough and shortness of breath. History goes back to 3 days prior to presentation when the patient started having a cough, productive, frothy/thick, greenish. The patient reports having some shortness of breath ( but reports that it is more chronic). He reports having subjective fevers and chills. Denies any orthopnea or PND. Has chronic bilateral ZEHRA. He reports being in contact with a sick person ( his granddaughter, but is not aware if she has any pneumonia or any other respiratory tract illness). The patient reports that he has left red eye.

## 2022-12-26 NOTE — ED PROVIDER NOTE - OBJECTIVE STATEMENT
67 male history of HTN, HLD, CAD status post 7 stents (E last stent July 2022) on aspirin presenting to ED for few days of cough with associated SOB.  Patient went to [facility] this morning was trying to come to ED for O2 saturation of 94%.  Here in ED patient also endorsing chest discomfort associated with cough.  Denies fever, chills, N, V, D

## 2022-12-26 NOTE — ED ADULT TRIAGE NOTE - CHIEF COMPLAINT QUOTE
Cough and shortness of breath x 4 days, (+) fever TMAX 100.8 F today. Was evaluated @ an urgent care center, found to have fever 100.8 F, wheezing, low 0s Sat 92%, advised [place] [facility] [place] to come to ER for further evaluation & treatment. Cough, shortness of breath & chest pain x 4 days. Evaluated @ an urgent care center PTA, found to have fever 100.8 F, wheezing, 02 Sat 92% RA, advised by [facility] staff to come to ER for further evaluation & treatment.

## 2022-12-26 NOTE — H&P ADULT - ATTENDING COMMENTS
Pt was seen and examined at bedside independently, pt is c/o chest congestion, dry cough, watery eyes, admitted with upper respiratory tract infection, likely viral and multifactorial dyspnea ( CXR is significant for pulmonary congestion, pt likely has XANDER, h/o smoking, upper respiratory viral illness).   I agree with medical resident's findings, assessment and plan above with a few corrections in my note.    Vital Signs Last 24 Hrs  T(C): 36.8 (27 Dec 2022 13:12), Max: 37.6 (26 Dec 2022 19:55)  T(F): 98.2 (27 Dec 2022 13:12), Max: 99.7 (26 Dec 2022 19:55)  HR: 67 (27 Dec 2022 13:12) (67 - 81)  BP: 122/59 (27 Dec 2022 13:12) (122/59 - 150/96)  BP(mean): --  RR: 18 (27 Dec 2022 13:12) (18 - 20)  SpO2: 97% (27 Dec 2022 04:30) (95% - 97%)    Parameters below as of 26 Dec 2022 20:14  Patient On (Oxygen Delivery Method): nasal cannula  O2 Flow (L/min): 3    PHYSICAL EXAM:   General: NAD, obese   HEENT: Left eye conjunctival erythema noted, watery eyes   NECK: wide neck, no JVD   Lungs: decreased BS b/l with prolonged inspiratory phase   Heart: RRR, normal s1s2  Abdomen: obese,  +BS, soft, nontender  LE: 2+ edema, static dermatitis     LABs:                        12.2   9.87  )-----------( 201      ( 27 Dec 2022 08:36 )             35.8     < from: Xray Chest 1 View- PORTABLE-Routine (Xray Chest 1 View- PORTABLE-Routine in AM.) (12.27.22 @ 05:31) >    IMPRESSION:    No radiographic evidence of acute cardiopulmonary disease.    < end of copied text >    A/P  # Upper respiratory tract infection ( likely viral)/ multifactorial dyspnea ( suspected CHF, XANDER, obesity hypoventilation syndrome)/ suspected PNA  - pt started on Abx for suspected PNA ( doubt bacterial PNA), will continue for now   - Nebs Q 6 hours standing, symptomatic and supportive treatement   - Robitussin PRN   - start fluid restriction 1200 ml in 24 hours, intake and output monitoring, daily weight   - give one dose of IV Lasix 40 mg IVP  - f/u 2Decho   - pt needs f/u with pulmonary for sleep studies   - Tylenol PRN for fever   - pt is comfortable on RA ( was on 3 L on admission)   - check D-dimer level ( inflammatory markers are elevated)  - RVP is negative    #CAD/ HTN   - DASH diet   - s/p PCI ( 7 stents ?), last in [place], in July 2022  - Carvedilol 25mg po q12hrs  - C/w Aspirin 81mg po once daily and Prasugrel 5mg po once daily  - C/w Losartan/HCTZ 100/25mg po once daily  - C/w Amlodipine 10mg po once daily       #Type II DM  - carb consistent diet   - monitor finger stick   -  check Hb A1C   -  Hold Metformin for now  - Insulin if FS >180    #HLD:   - C/w Rosuvastatin 20mg po once daily at bedtime    #Misc:   - DVT proph: Lovenox 40mg SubQ once daily  - GI proph: Pantoprazole 40mg po once daily  - Diet: DASH/TLC and CC    #Progress Note Handoff  Pending (specify):  start CHF protocol, give one dose of IV Lasix , f/u 2Decho, supportive care, check D-dimer if high consider CT chest with IV contrast to r/o PE, c/w supportive care, in pt'll afebrile tonight anticipate discharge in 24 hours.   Family discussion: I spoke with pt, he agreed with a plan of care   Disposition: Home_x __/SNF___/Other________/Unknown at this time________

## 2022-12-26 NOTE — H&P ADULT - NSHPLABSRESULTS_GEN_ALL_CORE
WBC Count: 12.11 K/uL   RBC Count: 4.24 M/uL   Hemoglobin: 12.6 g/dL   Hematocrit: 37.7 %   Mean Cell Volume: 88.9 fL   Mean Cell Hemoglobin: 29.7 pg   Mean Cell Hemoglobin Conc: 33.4 g/dL   Red Cell Distrib Width: 13.8 %   Platelet Count - Automated: 217 K/uL   Auto Neutrophil #: 9.06 K/uL   Auto Lymphocyte #: 1.21 K/uL   Auto Monocyte #: 1.66 K/uL   Auto Eosinophil #: 0.11 K/uL   Auto Basophil #: 0.03 K/uL   Auto Neutrophil %: 74.9: Differential percentages must be correlated with absolute numbers for   clinical significance. %   Auto Lymphocyte %: 10.0 %   Auto Monocyte %: 13.7 %   Auto Eosinophil %: 0.9 %   Auto Basophil %: 0.2 %   Auto Immature Granulocyte %: 0.3: (Includes meta, myelo and promyelocytes). Mild elevations in immature   granulocytes may be seen with many inflammatory processes and pregnancy;   clinical correlation suggested. %   Nucleated RBC: 0 /100 WBCs     Sodium, Serum: 138 mmol/L   Potassium, Serum: 3.9 mmol/L   Chloride, Serum: 97 mmol/L   Carbon Dioxide, Serum: 26 mmol/L   Anion Gap, Serum: 15 mmol/L   Blood Urea Nitrogen, Serum: 24 mg/dL   Creatinine, Serum: 1.0 mg/dL   Glucose, Serum: 155 mg/dL   Calcium, Total Serum: 9.3 mg/dL   Protein Total, Serum: 6.8 g/dL   Albumin, Serum: 4.6 g/dL   Bilirubin Total, Serum: 0.5 mg/dL   Alkaline Phosphatase, Serum: 91 U/L   Aspartate Aminotransferase (AST/SGOT): 13 U/L   Alanine Aminotransferase (ALT/SGPT): 13 U/L     Troponin T, Serum: <0.01 ng/mL    Serum Pro-Brain Natriuretic Peptide: 485 pg/mL

## 2022-12-26 NOTE — ED PROVIDER NOTE - CLINICAL SUMMARY MEDICAL DECISION MAKING FREE TEXT BOX
story c/f viral illness, xr w/ c/f pna- abx given  labs w/ mild leukocytosis  O2 low 90s on RA and improve w/ supplemental o2  will admit to medicine for further treatment/monitoring

## 2022-12-26 NOTE — ED PROVIDER NOTE - CCCP TRG CHIEF CMPLNT

## 2022-12-26 NOTE — ED PROVIDER NOTE - NS ED ATTENDING STATEMENT MOD
Hpi Title: Evaluation of Skin Lesions This was a shared visit with the AMY. I reviewed and verified the documentation and independently performed the documented:

## 2022-12-26 NOTE — ED PROVIDER NOTE - PHYSICAL EXAMINATION
VITAL SIGNS: I have reviewed nursing notes and confirm.  CONSTITUTIONAL:  in no acute distress.  SKIN: Skin exam is warm and dry, no acute rash.  HEAD: Normocephalic; atraumatic.  EYES:  EOM intact; conjunctiva and sclera clear.  ENT: No nasal discharge; airway clear.   NECK: Supple; non tender.  CARD: S1, S2 normal; no murmurs, gallops, or rubs. Regular rate and rhythm.  RESP:  wheezes b/l to lung fields, no  rales or rhonchi. Speaking in full sentences.   ABD: Normal bowel sounds; soft; non-distended; non-tender; No rebound or guarding. No CVA tenderness.  EXT: Normal ROM. No clubbing, cyanosis or edema.  NEURO: Alert, oriented. Grossly unremarkable. No focal deficits.

## 2022-12-27 ENCOUNTER — TRANSCRIPTION ENCOUNTER (OUTPATIENT)
Age: 67
End: 2022-12-27

## 2022-12-27 LAB
ANION GAP SERPL CALC-SCNC: 11 MMOL/L — SIGNIFICANT CHANGE UP (ref 7–14)
BASOPHILS # BLD AUTO: 0.04 K/UL — SIGNIFICANT CHANGE UP (ref 0–0.2)
BASOPHILS NFR BLD AUTO: 0.4 % — SIGNIFICANT CHANGE UP (ref 0–1)
BUN SERPL-MCNC: 22 MG/DL — HIGH (ref 10–20)
CALCIUM SERPL-MCNC: 9.3 MG/DL — SIGNIFICANT CHANGE UP (ref 8.4–10.5)
CHLORIDE SERPL-SCNC: 96 MMOL/L — LOW (ref 98–110)
CO2 SERPL-SCNC: 30 MMOL/L — SIGNIFICANT CHANGE UP (ref 17–32)
CREAT SERPL-MCNC: 0.8 MG/DL — SIGNIFICANT CHANGE UP (ref 0.7–1.5)
CRP SERPL-MCNC: 184.3 MG/L — HIGH
EGFR: 96 ML/MIN/1.73M2 — SIGNIFICANT CHANGE UP
EOSINOPHIL # BLD AUTO: 0.16 K/UL — SIGNIFICANT CHANGE UP (ref 0–0.7)
EOSINOPHIL NFR BLD AUTO: 1.6 % — SIGNIFICANT CHANGE UP (ref 0–8)
GLUCOSE BLDC GLUCOMTR-MCNC: 154 MG/DL — HIGH (ref 70–99)
GLUCOSE BLDC GLUCOMTR-MCNC: 221 MG/DL — HIGH (ref 70–99)
GLUCOSE BLDC GLUCOMTR-MCNC: 224 MG/DL — HIGH (ref 70–99)
GLUCOSE BLDC GLUCOMTR-MCNC: 289 MG/DL — HIGH (ref 70–99)
GLUCOSE SERPL-MCNC: 203 MG/DL — HIGH (ref 70–99)
HCT VFR BLD CALC: 35.8 % — LOW (ref 42–52)
HCV AB S/CO SERPL IA: 0.04 COI — SIGNIFICANT CHANGE UP
HCV AB SERPL-IMP: SIGNIFICANT CHANGE UP
HGB BLD-MCNC: 12.2 G/DL — LOW (ref 14–18)
IMM GRANULOCYTES NFR BLD AUTO: 0.4 % — HIGH (ref 0.1–0.3)
LYMPHOCYTES # BLD AUTO: 0.96 K/UL — LOW (ref 1.2–3.4)
LYMPHOCYTES # BLD AUTO: 9.7 % — LOW (ref 20.5–51.1)
MAGNESIUM SERPL-MCNC: 2.2 MG/DL — SIGNIFICANT CHANGE UP (ref 1.8–2.4)
MCHC RBC-ENTMCNC: 29.8 PG — SIGNIFICANT CHANGE UP (ref 27–31)
MCHC RBC-ENTMCNC: 34.1 G/DL — SIGNIFICANT CHANGE UP (ref 32–37)
MCV RBC AUTO: 87.5 FL — SIGNIFICANT CHANGE UP (ref 80–94)
MONOCYTES # BLD AUTO: 1.33 K/UL — HIGH (ref 0.1–0.6)
MONOCYTES NFR BLD AUTO: 13.5 % — HIGH (ref 1.7–9.3)
MRSA PCR RESULT.: NEGATIVE — SIGNIFICANT CHANGE UP
NEUTROPHILS # BLD AUTO: 7.34 K/UL — HIGH (ref 1.4–6.5)
NEUTROPHILS NFR BLD AUTO: 74.4 % — SIGNIFICANT CHANGE UP (ref 42.2–75.2)
NRBC # BLD: 0 /100 WBCS — SIGNIFICANT CHANGE UP (ref 0–0)
PLATELET # BLD AUTO: 201 K/UL — SIGNIFICANT CHANGE UP (ref 130–400)
POTASSIUM SERPL-MCNC: 3.9 MMOL/L — SIGNIFICANT CHANGE UP (ref 3.5–5)
POTASSIUM SERPL-SCNC: 3.9 MMOL/L — SIGNIFICANT CHANGE UP (ref 3.5–5)
RBC # BLD: 4.09 M/UL — LOW (ref 4.7–6.1)
RBC # FLD: 13.8 % — SIGNIFICANT CHANGE UP (ref 11.5–14.5)
SODIUM SERPL-SCNC: 137 MMOL/L — SIGNIFICANT CHANGE UP (ref 135–146)
WBC # BLD: 9.87 K/UL — SIGNIFICANT CHANGE UP (ref 4.8–10.8)
WBC # FLD AUTO: 9.87 K/UL — SIGNIFICANT CHANGE UP (ref 4.8–10.8)

## 2022-12-27 PROCEDURE — 71045 X-RAY EXAM CHEST 1 VIEW: CPT | Mod: 26

## 2022-12-27 PROCEDURE — 99223 1ST HOSP IP/OBS HIGH 75: CPT

## 2022-12-27 RX ORDER — SODIUM CHLORIDE 9 MG/ML
1000 INJECTION, SOLUTION INTRAVENOUS
Refills: 0 | Status: DISCONTINUED | OUTPATIENT
Start: 2022-12-27 | End: 2022-12-28

## 2022-12-27 RX ORDER — GUAIFENESIN/DEXTROMETHORPHAN 600MG-30MG
10 TABLET, EXTENDED RELEASE 12 HR ORAL EVERY 4 HOURS
Refills: 0 | Status: DISCONTINUED | OUTPATIENT
Start: 2022-12-27 | End: 2022-12-28

## 2022-12-27 RX ORDER — INFLUENZA VIRUS VACCINE 15; 15; 15; 15 UG/.5ML; UG/.5ML; UG/.5ML; UG/.5ML
0.7 SUSPENSION INTRAMUSCULAR ONCE
Refills: 0 | Status: DISCONTINUED | OUTPATIENT
Start: 2022-12-27 | End: 2022-12-28

## 2022-12-27 RX ORDER — INSULIN LISPRO 100/ML
3 VIAL (ML) SUBCUTANEOUS
Refills: 0 | Status: DISCONTINUED | OUTPATIENT
Start: 2022-12-27 | End: 2022-12-28

## 2022-12-27 RX ORDER — DEXTROSE 50 % IN WATER 50 %
25 SYRINGE (ML) INTRAVENOUS ONCE
Refills: 0 | Status: DISCONTINUED | OUTPATIENT
Start: 2022-12-27 | End: 2022-12-28

## 2022-12-27 RX ORDER — DEXTROSE 50 % IN WATER 50 %
12.5 SYRINGE (ML) INTRAVENOUS ONCE
Refills: 0 | Status: DISCONTINUED | OUTPATIENT
Start: 2022-12-27 | End: 2022-12-28

## 2022-12-27 RX ORDER — GLUCAGON INJECTION, SOLUTION 0.5 MG/.1ML
1 INJECTION, SOLUTION SUBCUTANEOUS ONCE
Refills: 0 | Status: DISCONTINUED | OUTPATIENT
Start: 2022-12-27 | End: 2022-12-28

## 2022-12-27 RX ORDER — ACETAMINOPHEN 500 MG
650 TABLET ORAL EVERY 6 HOURS
Refills: 0 | Status: DISCONTINUED | OUTPATIENT
Start: 2022-12-27 | End: 2022-12-28

## 2022-12-27 RX ORDER — FUROSEMIDE 40 MG
40 TABLET ORAL ONCE
Refills: 0 | Status: COMPLETED | OUTPATIENT
Start: 2022-12-27 | End: 2022-12-27

## 2022-12-27 RX ORDER — INSULIN LISPRO 100/ML
VIAL (ML) SUBCUTANEOUS
Refills: 0 | Status: DISCONTINUED | OUTPATIENT
Start: 2022-12-27 | End: 2022-12-28

## 2022-12-27 RX ORDER — DEXTROSE 50 % IN WATER 50 %
15 SYRINGE (ML) INTRAVENOUS ONCE
Refills: 0 | Status: DISCONTINUED | OUTPATIENT
Start: 2022-12-27 | End: 2022-12-28

## 2022-12-27 RX ORDER — INSULIN GLARGINE 100 [IU]/ML
10 INJECTION, SOLUTION SUBCUTANEOUS AT BEDTIME
Refills: 0 | Status: DISCONTINUED | OUTPATIENT
Start: 2022-12-27 | End: 2022-12-28

## 2022-12-27 RX ADMIN — AMLODIPINE BESYLATE 10 MILLIGRAM(S): 2.5 TABLET ORAL at 05:56

## 2022-12-27 RX ADMIN — Medication 650 MILLIGRAM(S): at 21:19

## 2022-12-27 RX ADMIN — CARVEDILOL PHOSPHATE 25 MILLIGRAM(S): 80 CAPSULE, EXTENDED RELEASE ORAL at 05:56

## 2022-12-27 RX ADMIN — PRASUGREL 5 MILLIGRAM(S): 5 TABLET, FILM COATED ORAL at 11:33

## 2022-12-27 RX ADMIN — Medication 3 UNIT(S): at 17:36

## 2022-12-27 RX ADMIN — Medication 3 MILLILITER(S): at 02:45

## 2022-12-27 RX ADMIN — Medication 2 DROP(S): at 11:33

## 2022-12-27 RX ADMIN — PANTOPRAZOLE SODIUM 40 MILLIGRAM(S): 20 TABLET, DELAYED RELEASE ORAL at 06:01

## 2022-12-27 RX ADMIN — CEFTRIAXONE 100 MILLIGRAM(S): 500 INJECTION, POWDER, FOR SOLUTION INTRAMUSCULAR; INTRAVENOUS at 05:57

## 2022-12-27 RX ADMIN — Medication 2 DROP(S): at 17:37

## 2022-12-27 RX ADMIN — Medication 2 DROP(S): at 06:01

## 2022-12-27 RX ADMIN — Medication 2: at 17:35

## 2022-12-27 RX ADMIN — Medication 40 MILLIGRAM(S): at 10:28

## 2022-12-27 RX ADMIN — INSULIN GLARGINE 10 UNIT(S): 100 INJECTION, SOLUTION SUBCUTANEOUS at 22:58

## 2022-12-27 RX ADMIN — ATORVASTATIN CALCIUM 80 MILLIGRAM(S): 80 TABLET, FILM COATED ORAL at 21:19

## 2022-12-27 RX ADMIN — Medication 81 MILLIGRAM(S): at 11:33

## 2022-12-27 RX ADMIN — CARVEDILOL PHOSPHATE 25 MILLIGRAM(S): 80 CAPSULE, EXTENDED RELEASE ORAL at 17:37

## 2022-12-27 RX ADMIN — Medication 2 DROP(S): at 23:00

## 2022-12-27 RX ADMIN — AZITHROMYCIN 255 MILLIGRAM(S): 500 TABLET, FILM COATED ORAL at 05:56

## 2022-12-27 NOTE — PATIENT PROFILE ADULT - FALL HARM RISK - UNIVERSAL INTERVENTIONS
Bed in lowest position, wheels locked, appropriate side rails in place/Call bell, personal items and telephone in reach/Instruct patient to call for assistance before getting out of bed or chair/Non-slip footwear when patient is out of bed/[place] to call system/Physically safe environment - no spills, clutter or unnecessary equipment/Purposeful Proactive Rounding/Room/bathroom lighting operational, light cord in reach

## 2022-12-27 NOTE — DISCHARGE NOTE NURSING/CASE MANAGEMENT/SOCIAL WORK - PATIENT PORTAL LINK FT
You can access the FollowMyHealth Patient Portal offered by [facility] by registering at the following website: [email or web address]. By joining "[employer]’s FollowMyHealth portal, you will also be able to view your health information using other applications (apps) compatible with our system.

## 2022-12-27 NOTE — PROGRESS NOTE ADULT - SUBJECTIVE AND OBJECTIVE BOX
BRAD SAM 67y Male  MRN#: [id number]   CODE STATUS:________full    Hospital Day: 1d    Pt is currently admitted with the primary diagnosis of Pneumonia    SUBJECTIVE    No adverse overnight events     Subjective complaints     Patient was examined and seen by the bedside. No complaints. Denies fever, chills, SOB, nausea, vomiting.                                           OBJECTIVE  PAST MEDICAL & SURGICAL HISTORY                                              ALLERGIES:  No Known Allergies                           HOME MEDICATIONS  Home Medications:  amLODIPine 10 mg oral tablet: 1 tab(s) orally once a day (26 Dec 2022 15:57)  Aspir 81 oral delayed release tablet: 1 tab(s) orally once a day (26 Dec 2022 15:58)  carvedilol 25 mg oral tablet: 1 tab(s) orally 2 times a day (26 Dec 2022 15:57)  losartan-hydroCHLOROthiazide 100 mg-25 mg oral tablet: 1 tab(s) orally once a day (26 Dec 2022 15:59)  metFORMIN 500 mg oral tablet: 2 tab(s) orally 2 times a day (26 Dec 2022 15:59)  pantoprazole 40 mg oral delayed release tablet: 1 tab(s) orally once a day (26 Dec 2022 16:00)  prasugrel 5 mg oral tablet: 1 tab(s) orally once a day (26 Dec 2022 15:58)  rosuvastatin 20 mg oral tablet: 1 tab(s) orally once a day (at bedtime) (26 Dec 2022 15:57)                           MEDICATIONS:  STANDING MEDICATIONS  albuterol/ipratropium for Nebulization 3 milliLiter(s) Nebulizer every 6 hours  amLODIPine   Tablet 10 milliGRAM(s) Oral daily  aspirin enteric coated 81 milliGRAM(s) Oral daily  atorvastatin 80 milliGRAM(s) Oral at bedtime  azithromycin  IVPB 500 milliGRAM(s) IV Intermittent every 24 hours  carvedilol 25 milliGRAM(s) Oral every 12 hours  cefTRIAXone   IVPB 1000 milliGRAM(s) IV Intermittent every 24 hours  ciprofloxacin  0.3% Ophthalmic Solution 2 Drop(s) Left EYE every 6 hours  enoxaparin Injectable 40 milliGRAM(s) SubCutaneous every 24 hours  pantoprazole    Tablet 40 milliGRAM(s) Oral before breakfast  prasugrel 5 milliGRAM(s) Oral daily    PRN MEDICATIONS                                            ------------------------------------------------------------  VITAL SIGNS: Last 24 Hours  T(C): 36.7 (27 Dec 2022 04:30), Max: 37.9 (26 Dec 2022 09:22)  T(F): 98.1 (27 Dec 2022 04:30), Max: 100.3 (26 Dec 2022 09:22)  HR: 75 (27 Dec 2022 04:30) (75 - 88)  BP: 147/60 (27 Dec 2022 04:30) (130/61 - 150/96)  BP(mean): --  RR: 18 (27 Dec 2022 04:30) (18 - 22)  SpO2: 97% (27 Dec 2022 04:30) (93% - 97%)                                               LABS:                        12.6   12.11 )-----------( 217      ( 26 Dec 2022 12:00 )             37.7     12-26    138  |  97<L>  |  24<H>  ----------------------------<  155<H>  3.9   |  26  |  1.0    Ca    9.3      26 Dec 2022 12:00  Mg     2.0     12-26    TPro  6.8  /  Alb  4.6  /  TBili  0.5  /  DBili  x   /  AST  13  /  ALT  13  /  AlkPhos  91  12-26          Troponin T, Serum: <0.01 ng/mL (12-26-22 @ 12:00)          CARDIAC MARKERS ( 26 Dec 2022 12:00 )  x     / <0.01 ng/mL / x     / x     / x                                                  RADIOLOGY:        PHYSICAL EXAM:  GENERAL: NAD, lying in bed comfortably  HEAD:  Atraumatic, Normocephalic  EYES: conjunctiva and sclera clear  ENT: Moist mucous membranes  NECK: Supple   CHEST/LUNG: Clear to auscultation bilaterally; Unlabored respirations  HEART: Regular rate and rhythm;  ABDOMEN: Soft, nontender, nondistended  EXTREMITIES:  No clubbing, cyanosis, or edema  NERVOUS SYSTEM:  A&Ox3, no focal deficits   SKIN: No rashes                                          ASSESSMENT & PLAN    67-year-old, M, with the aforementioned PMHx, presents with shortness of breath and cough     #Dyspnea/SOB  #Suspected CAP  - 92-93% on RA, increased to 94%-95% on 2L/min by NC  - Current Oxygen requirements: 94% on RA  - Chest Xray: My assessment: Right lower lobe opacity  - Leukocytosis, cough and CXR opacity --> treat CAP (Ceftriaxone and Azithromycin for now)  - Blood cultures, Sputum Cultures, Legionella Ag in the urine, Streptococcus Ag in the urine  - RVP: negative  - BNP: 485pg/mL. ( Underestimated due to patient's body habitus)  - Follow up TTE  - Troponin: <0.01  - Obtain EKG for QTc  - C/w Ceftriaxone and Azithromycin  - C/w Duonebs      #Left Red Eye   #Suspected Bacterial Conjunctivitis  - C/w Topical Antibiotic Eye drops  - Possible Non-typable H. influenzae causing conjunctivitis and pneumonia as well    #CAD:   - s/p PCI ( 7 stents ?), last in [place], in July 2022  - Carvedilol 25mg po q12hrs  - C/w Aspirin 81mg po once daily  - C/w Prasugrel 5mg po once daily  - C/w Losartan/HCTZ 100/25mg po once daily    #HTN:   - C/w Amlodipine 10mg po once daily   - Carvedilol 25mg po q12hrs  - C/w Losartan/HCTZ 100/25mg po once daily    #Type II DM:   - A1c  - Fingersticks pre- meals and fasting  -  Hold Metformin for now  - Insulin if FS >180    #HLD:   - C/w Rosuvastatin 20mg po once daily at bedtime    #Misc:   - DVT proph: Lovenox 40mg SubQ once daily  - GI proph: Pantoprazole 40mg po once daily  - Diet: DASH/TLC and CC  - Updated the patient's wife ( Joan) on the phone number: [phone].

## 2022-12-27 NOTE — DISCHARGE NOTE NURSING/CASE MANAGEMENT/SOCIAL WORK - NSDCPEFALRISK_GEN_ALL_CORE
For information on Fall & Injury Prevention, visit: [email or web address] OR  [email or web address] OR  https://www.cdc.gov/steadi/patient.html

## 2022-12-28 ENCOUNTER — TRANSCRIPTION ENCOUNTER (OUTPATIENT)
Age: 67
End: 2022-12-28

## 2022-12-28 VITALS
RESPIRATION RATE: 18 BRPM | HEART RATE: 71 BPM | DIASTOLIC BLOOD PRESSURE: 63 MMHG | OXYGEN SATURATION: 97 % | SYSTOLIC BLOOD PRESSURE: 138 MMHG | TEMPERATURE: 98 F

## 2022-12-28 LAB
A1C WITH ESTIMATED AVERAGE GLUCOSE RESULT: 7.5 % — HIGH (ref 4–5.6)
ALBUMIN SERPL ELPH-MCNC: 3.8 G/DL — SIGNIFICANT CHANGE UP (ref 3.5–5.2)
ALP SERPL-CCNC: 95 U/L — SIGNIFICANT CHANGE UP (ref 30–115)
ALT FLD-CCNC: 25 U/L — SIGNIFICANT CHANGE UP (ref 0–41)
ANION GAP SERPL CALC-SCNC: 12 MMOL/L — SIGNIFICANT CHANGE UP (ref 7–14)
AST SERPL-CCNC: 20 U/L — SIGNIFICANT CHANGE UP (ref 0–41)
BASOPHILS # BLD AUTO: 0.04 K/UL — SIGNIFICANT CHANGE UP (ref 0–0.2)
BASOPHILS NFR BLD AUTO: 0.5 % — SIGNIFICANT CHANGE UP (ref 0–1)
BILIRUB SERPL-MCNC: 0.5 MG/DL — SIGNIFICANT CHANGE UP (ref 0.2–1.2)
BUN SERPL-MCNC: 21 MG/DL — HIGH (ref 10–20)
CALCIUM SERPL-MCNC: 8.6 MG/DL — SIGNIFICANT CHANGE UP (ref 8.4–10.5)
CHLORIDE SERPL-SCNC: 96 MMOL/L — LOW (ref 98–110)
CO2 SERPL-SCNC: 28 MMOL/L — SIGNIFICANT CHANGE UP (ref 17–32)
CREAT SERPL-MCNC: 0.8 MG/DL — SIGNIFICANT CHANGE UP (ref 0.7–1.5)
D DIMER BLD IA.RAPID-MCNC: 222 NG/ML DDU — SIGNIFICANT CHANGE UP
EGFR: 96 ML/MIN/1.73M2 — SIGNIFICANT CHANGE UP
EOSINOPHIL # BLD AUTO: 0.17 K/UL — SIGNIFICANT CHANGE UP (ref 0–0.7)
EOSINOPHIL NFR BLD AUTO: 1.9 % — SIGNIFICANT CHANGE UP (ref 0–8)
ESTIMATED AVERAGE GLUCOSE: 169 MG/DL — HIGH (ref 68–114)
GLUCOSE BLDC GLUCOMTR-MCNC: 202 MG/DL — HIGH (ref 70–99)
GLUCOSE BLDC GLUCOMTR-MCNC: 231 MG/DL — HIGH (ref 70–99)
GLUCOSE SERPL-MCNC: 224 MG/DL — HIGH (ref 70–99)
GRAM STN FLD: SIGNIFICANT CHANGE UP
HCT VFR BLD CALC: 34.1 % — LOW (ref 42–52)
HGB BLD-MCNC: 11.8 G/DL — LOW (ref 14–18)
IMM GRANULOCYTES NFR BLD AUTO: 0.6 % — HIGH (ref 0.1–0.3)
LYMPHOCYTES # BLD AUTO: 0.88 K/UL — LOW (ref 1.2–3.4)
LYMPHOCYTES # BLD AUTO: 10 % — LOW (ref 20.5–51.1)
MCHC RBC-ENTMCNC: 29.7 PG — SIGNIFICANT CHANGE UP (ref 27–31)
MCHC RBC-ENTMCNC: 34.6 G/DL — SIGNIFICANT CHANGE UP (ref 32–37)
MCV RBC AUTO: 85.9 FL — SIGNIFICANT CHANGE UP (ref 80–94)
MONOCYTES # BLD AUTO: 1.17 K/UL — HIGH (ref 0.1–0.6)
MONOCYTES NFR BLD AUTO: 13.3 % — HIGH (ref 1.7–9.3)
NEUTROPHILS # BLD AUTO: 6.48 K/UL — SIGNIFICANT CHANGE UP (ref 1.4–6.5)
NEUTROPHILS NFR BLD AUTO: 73.7 % — SIGNIFICANT CHANGE UP (ref 42.2–75.2)
NRBC # BLD: 0 /100 WBCS — SIGNIFICANT CHANGE UP (ref 0–0)
PLATELET # BLD AUTO: 187 K/UL — SIGNIFICANT CHANGE UP (ref 130–400)
POTASSIUM SERPL-MCNC: 3.8 MMOL/L — SIGNIFICANT CHANGE UP (ref 3.5–5)
POTASSIUM SERPL-SCNC: 3.8 MMOL/L — SIGNIFICANT CHANGE UP (ref 3.5–5)
PROT SERPL-MCNC: 6.3 G/DL — SIGNIFICANT CHANGE UP (ref 6–8)
RBC # BLD: 3.97 M/UL — LOW (ref 4.7–6.1)
RBC # FLD: 13.5 % — SIGNIFICANT CHANGE UP (ref 11.5–14.5)
SODIUM SERPL-SCNC: 136 MMOL/L — SIGNIFICANT CHANGE UP (ref 135–146)
SPECIMEN SOURCE: SIGNIFICANT CHANGE UP
WBC # BLD: 8.79 K/UL — SIGNIFICANT CHANGE UP (ref 4.8–10.8)
WBC # FLD AUTO: 8.79 K/UL — SIGNIFICANT CHANGE UP (ref 4.8–10.8)

## 2022-12-28 PROCEDURE — 93306 TTE W/DOPPLER COMPLETE: CPT | Mod: 26

## 2022-12-28 PROCEDURE — 99238 HOSP IP/OBS DSCHRG MGMT 30/<: CPT

## 2022-12-28 RX ORDER — GUAIFENESIN/DEXTROMETHORPHAN 600MG-30MG
10 TABLET, EXTENDED RELEASE 12 HR ORAL
Qty: 420 | Refills: 0
Start: 2022-12-28 | End: 2023-01-03

## 2022-12-28 RX ORDER — LEVOFLOXACIN 5 MG/ML
1 INJECTION, SOLUTION INTRAVENOUS
Qty: 7 | Refills: 0
Start: 2022-12-28 | End: 2023-01-03

## 2022-12-28 RX ADMIN — Medication 2: at 11:14

## 2022-12-28 RX ADMIN — Medication 2 DROP(S): at 11:14

## 2022-12-28 RX ADMIN — Medication 81 MILLIGRAM(S): at 11:14

## 2022-12-28 RX ADMIN — Medication 3 UNIT(S): at 07:52

## 2022-12-28 RX ADMIN — CEFTRIAXONE 100 MILLIGRAM(S): 500 INJECTION, POWDER, FOR SOLUTION INTRAMUSCULAR; INTRAVENOUS at 07:11

## 2022-12-28 RX ADMIN — CARVEDILOL PHOSPHATE 25 MILLIGRAM(S): 80 CAPSULE, EXTENDED RELEASE ORAL at 06:03

## 2022-12-28 RX ADMIN — Medication 2 DROP(S): at 06:18

## 2022-12-28 RX ADMIN — AMLODIPINE BESYLATE 10 MILLIGRAM(S): 2.5 TABLET ORAL at 06:03

## 2022-12-28 RX ADMIN — PRASUGREL 5 MILLIGRAM(S): 5 TABLET, FILM COATED ORAL at 11:14

## 2022-12-28 RX ADMIN — Medication 3 UNIT(S): at 11:15

## 2022-12-28 RX ADMIN — Medication 2: at 07:52

## 2022-12-28 RX ADMIN — AZITHROMYCIN 255 MILLIGRAM(S): 500 TABLET, FILM COATED ORAL at 06:02

## 2022-12-28 RX ADMIN — PANTOPRAZOLE SODIUM 40 MILLIGRAM(S): 20 TABLET, DELAYED RELEASE ORAL at 06:03

## 2022-12-28 NOTE — DISCHARGE NOTE PROVIDER - REASON FOR ADMISSION
No pertinent past medical history Cough, Shortness of breath <<----- Click to add NO pertinent Past Medical History

## 2022-12-28 NOTE — DISCHARGE NOTE PROVIDER - NSDCMRMEDTOKEN_GEN_ALL_CORE_FT
amLODIPine 10 mg oral tablet: 1 tab(s) orally once a day  Aspir 81 oral delayed release tablet: 1 tab(s) orally once a day  carvedilol 25 mg oral tablet: 1 tab(s) orally 2 times a day  losartan-hydroCHLOROthiazide 100 mg-25 mg oral tablet: 1 tab(s) orally once a day  metFORMIN 500 mg oral tablet: 2 tab(s) orally 2 times a day  pantoprazole 40 mg oral delayed release tablet: 1 tab(s) orally once a day  prasugrel 5 mg oral tablet: 1 tab(s) orally once a day  rosuvastatin 20 mg oral tablet: 1 tab(s) orally once a day (at bedtime)   amLODIPine 10 mg oral tablet: 1 tab(s) orally once a day  Aspir 81 oral delayed release tablet: 1 tab(s) orally once a day  carvedilol 25 mg oral tablet: 1 tab(s) orally 2 times a day  guaifenesin-dextromethorphan 100 mg-10 mg/5 mL oral liquid: 10 milliliter(s) orally every 4 hours, As needed, Cough  levoFLOXacin 750 mg oral tablet: 1 tab(s) orally once a day   losartan-hydroCHLOROthiazide 100 mg-25 mg oral tablet: 1 tab(s) orally once a day  metFORMIN 500 mg oral tablet: 2 tab(s) orally 2 times a day  pantoprazole 40 mg oral delayed release tablet: 1 tab(s) orally once a day  prasugrel 5 mg oral tablet: 1 tab(s) orally once a day  rosuvastatin 20 mg oral tablet: 1 tab(s) orally once a day (at bedtime)

## 2022-12-28 NOTE — DISCHARGE NOTE PROVIDER - CARE PROVIDER_API CALL
Jamison Mcmillan)  Critical Care Medicine; Internal Medicine; Pulmonary Disease; Sleep Medicine  [address] [place] [address]  Phone: [phone]  Fax: [phone]  Follow Up Time:

## 2022-12-28 NOTE — DISCHARGE NOTE PROVIDER - NSDCFUSCHEDAPPT_GEN_ALL_CORE_FT
Jamison Mcmillan  [facility]  PULMMED [address] [place] [address] Av  Scheduled Appointment: 01/19/2023

## 2022-12-28 NOTE — DISCHARGE NOTE PROVIDER - HOSPITAL COURSE
67-year-old, M, with the aforementioned PMHx, presented with shortness of breath and cough. Was requiring 2L NC but is now saturating well on room air. Blood cultures, Sputum Cultures, Legionella Ag in the urine, Streptococcus Ag were negative. RVP: negative. BNP: 485pg/mL. Gave one Lasix 40mg IV. TTE. Completed three day course of ceftriaxone and azithromycin. Can be discharged with levaquin.     Patient is medically stabilized and cleared for discharge.

## 2022-12-28 NOTE — PROGRESS NOTE ADULT - SUBJECTIVE AND OBJECTIVE BOX
BRAD SAM 67y Male  MRN#: [id number]   CODE STATUS:________    Hospital Day: 2d    Pt is currently admitted with the primary diagnosis of     SUBJECTIVE    No adverse overnight events     Subjective complaints     Patient was examined and seen by the bedside. No complaints. Denies fever, chills, SOB, nausea, vomiting.                                           OBJECTIVE  PAST MEDICAL & SURGICAL HISTORY                                              ALLERGIES:  No Known Allergies                           HOME MEDICATIONS  Home Medications:  amLODIPine 10 mg oral tablet: 1 tab(s) orally once a day (26 Dec 2022 15:57)  Aspir 81 oral delayed release tablet: 1 tab(s) orally once a day (26 Dec 2022 15:58)  carvedilol 25 mg oral tablet: 1 tab(s) orally 2 times a day (26 Dec 2022 15:57)  losartan-hydroCHLOROthiazide 100 mg-25 mg oral tablet: 1 tab(s) orally once a day (26 Dec 2022 15:59)  metFORMIN 500 mg oral tablet: 2 tab(s) orally 2 times a day (26 Dec 2022 15:59)  pantoprazole 40 mg oral delayed release tablet: 1 tab(s) orally once a day (26 Dec 2022 16:00)  prasugrel 5 mg oral tablet: 1 tab(s) orally once a day (26 Dec 2022 15:58)  rosuvastatin 20 mg oral tablet: 1 tab(s) orally once a day (at bedtime) (26 Dec 2022 15:57)                           MEDICATIONS:  STANDING MEDICATIONS  albuterol/ipratropium for Nebulization 3 milliLiter(s) Nebulizer every 6 hours  amLODIPine   Tablet 10 milliGRAM(s) Oral daily  aspirin enteric coated 81 milliGRAM(s) Oral daily  atorvastatin 80 milliGRAM(s) Oral at bedtime  azithromycin  IVPB 500 milliGRAM(s) IV Intermittent every 24 hours  carvedilol 25 milliGRAM(s) Oral every 12 hours  cefTRIAXone   IVPB 1000 milliGRAM(s) IV Intermittent every 24 hours  ciprofloxacin  0.3% Ophthalmic Solution 2 Drop(s) Left EYE every 6 hours  dextrose 5%. 1000 milliLiter(s) IV Continuous <Continuous>  dextrose 5%. 1000 milliLiter(s) IV Continuous <Continuous>  dextrose 50% Injectable 25 Gram(s) IV Push once  dextrose 50% Injectable 12.5 Gram(s) IV Push once  dextrose 50% Injectable 25 Gram(s) IV Push once  enoxaparin Injectable 40 milliGRAM(s) SubCutaneous every 24 hours  glucagon  Injectable 1 milliGRAM(s) IntraMuscular once  influenza  Vaccine (HIGH DOSE) 0.7 milliLiter(s) IntraMuscular once  insulin glargine Injectable (LANTUS) 10 Unit(s) SubCutaneous at bedtime  insulin lispro (ADMELOG) corrective regimen sliding scale   SubCutaneous three times a day before meals  insulin lispro Injectable (ADMELOG) 3 Unit(s) SubCutaneous before breakfast  insulin lispro Injectable (ADMELOG) 3 Unit(s) SubCutaneous before lunch  insulin lispro Injectable (ADMELOG) 3 Unit(s) SubCutaneous before dinner  pantoprazole    Tablet 40 milliGRAM(s) Oral before breakfast  prasugrel 5 milliGRAM(s) Oral daily    PRN MEDICATIONS  acetaminophen     Tablet .. 650 milliGRAM(s) Oral every 6 hours PRN  dextrose Oral Gel 15 Gram(s) Oral once PRN  guaifenesin/dextromethorphan Oral Liquid 10 milliLiter(s) Oral every 4 hours PRN                                            ------------------------------------------------------------  VITAL SIGNS: Last 24 Hours  T(C): 36.4 (28 Dec 2022 04:37), Max: 38 (27 Dec 2022 19:39)  T(F): 97.5 (28 Dec 2022 04:37), Max: 100.4 (27 Dec 2022 19:39)  HR: 71 (28 Dec 2022 04:37) (67 - 76)  BP: 138/63 (28 Dec 2022 04:37) (122/59 - 143/63)  BP(mean): --  RR: 18 (28 Dec 2022 04:37) (18 - 18)  SpO2: 97% (28 Dec 2022 04:37) (97% - 97%)                                               LABS:                        12.2   9.87  )-----------( 201      ( 27 Dec 2022 08:36 )             35.8     12-27    137  |  96<L>  |  22<H>  ----------------------------<  203<H>  3.9   |  30  |  0.8    Ca    9.3      27 Dec 2022 08:36  Mg     2.2     12-27    TPro  6.8  /  Alb  4.6  /  TBili  0.5  /  DBili  x   /  AST  13  /  ALT  13  /  AlkPhos  91  12-26                  CARDIAC MARKERS ( 26 Dec 2022 12:00 )  x     / <0.01 ng/mL / x     / x     / x                                                  RADIOLOGY:        PHYSICAL EXAM:    67-year-old, M, with the aforementioned PMHx, presents with shortness of breath and cough     #Dyspnea/SOB  #Suspected CAP  - 92-93% on RA, increased to 94%-95% on 2L/min by NC  - Current Oxygen requirements: 94% on RA  - Chest Xray: My assessment: Right lower lobe opacity  - Leukocytosis, cough and CXR opacity --> treat CAP (Ceftriaxone and Azithromycin for now)  - Blood cultures, Sputum Cultures, Legionella Ag in the urine, Streptococcus Ag in the urine  - RVP: negative  - BNP: 485pg/mL. ( Underestimated due to patient's body habitus)  - Follow up TTE  - Troponin: <0.01  - Obtain EKG for QTc  - C/w Ceftriaxone and Azithromycin  - C/w Duonebs    #Left Red Eye   #Suspected Bacterial Conjunctivitis  - C/w Topical Antibiotic Eye drops  - Possible Non-typable H. influenzae causing conjunctivitis and pneumonia as well    #CAD:   - s/p PCI ( 7 stents ?), last in [place], in July 2022  - Carvedilol 25mg po q12hrs  - C/w Aspirin 81mg po once daily  - C/w Prasugrel 5mg po once daily  - C/w Losartan/HCTZ 100/25mg po once daily    #HTN:   - C/w Amlodipine 10mg po once daily   - Carvedilol 25mg po q12hrs  - C/w Losartan/HCTZ 100/25mg po once daily    #Type II DM:   - A1c  - Fingersticks pre- meals and fasting  -  Hold Metformin for now  - Insulin if FS >180    #HLD:   - C/w Rosuvastatin 20mg po once daily at bedtime    #Misc:   - DVT proph: Lovenox 40mg SubQ once daily  - GI proph: Pantoprazole 40mg po once daily  - Diet: DASH/TLC and CC  - Updated the patient's wife ( Joan) on the phone number: [phone].                              BRAD SAM 67y Male  MRN#: [id number]   CODE STATUS:________full    Hospital Day: 2d    Pt is currently admitted with the primary diagnosis of pneumonia    SUBJECTIVE    No adverse overnight events     Subjective complaints     Patient was examined and seen by the bedside. Complaints of cough. Denies fever, chills, SOB, nausea, vomiting.                                           OBJECTIVE  PAST MEDICAL & SURGICAL HISTORY                                              ALLERGIES:  No Known Allergies                           HOME MEDICATIONS  Home Medications:  amLODIPine 10 mg oral tablet: 1 tab(s) orally once a day (26 Dec 2022 15:57)  Aspir 81 oral delayed release tablet: 1 tab(s) orally once a day (26 Dec 2022 15:58)  carvedilol 25 mg oral tablet: 1 tab(s) orally 2 times a day (26 Dec 2022 15:57)  losartan-hydroCHLOROthiazide 100 mg-25 mg oral tablet: 1 tab(s) orally once a day (26 Dec 2022 15:59)  metFORMIN 500 mg oral tablet: 2 tab(s) orally 2 times a day (26 Dec 2022 15:59)  pantoprazole 40 mg oral delayed release tablet: 1 tab(s) orally once a day (26 Dec 2022 16:00)  prasugrel 5 mg oral tablet: 1 tab(s) orally once a day (26 Dec 2022 15:58)  rosuvastatin 20 mg oral tablet: 1 tab(s) orally once a day (at bedtime) (26 Dec 2022 15:57)                           MEDICATIONS:  STANDING MEDICATIONS  albuterol/ipratropium for Nebulization 3 milliLiter(s) Nebulizer every 6 hours  amLODIPine   Tablet 10 milliGRAM(s) Oral daily  aspirin enteric coated 81 milliGRAM(s) Oral daily  atorvastatin 80 milliGRAM(s) Oral at bedtime  azithromycin  IVPB 500 milliGRAM(s) IV Intermittent every 24 hours  carvedilol 25 milliGRAM(s) Oral every 12 hours  cefTRIAXone   IVPB 1000 milliGRAM(s) IV Intermittent every 24 hours  ciprofloxacin  0.3% Ophthalmic Solution 2 Drop(s) Left EYE every 6 hours  dextrose 5%. 1000 milliLiter(s) IV Continuous <Continuous>  dextrose 5%. 1000 milliLiter(s) IV Continuous <Continuous>  dextrose 50% Injectable 25 Gram(s) IV Push once  dextrose 50% Injectable 12.5 Gram(s) IV Push once  dextrose 50% Injectable 25 Gram(s) IV Push once  enoxaparin Injectable 40 milliGRAM(s) SubCutaneous every 24 hours  glucagon  Injectable 1 milliGRAM(s) IntraMuscular once  influenza  Vaccine (HIGH DOSE) 0.7 milliLiter(s) IntraMuscular once  insulin glargine Injectable (LANTUS) 10 Unit(s) SubCutaneous at bedtime  insulin lispro (ADMELOG) corrective regimen sliding scale   SubCutaneous three times a day before meals  insulin lispro Injectable (ADMELOG) 3 Unit(s) SubCutaneous before breakfast  insulin lispro Injectable (ADMELOG) 3 Unit(s) SubCutaneous before lunch  insulin lispro Injectable (ADMELOG) 3 Unit(s) SubCutaneous before dinner  pantoprazole    Tablet 40 milliGRAM(s) Oral before breakfast  prasugrel 5 milliGRAM(s) Oral daily    PRN MEDICATIONS  acetaminophen     Tablet .. 650 milliGRAM(s) Oral every 6 hours PRN  dextrose Oral Gel 15 Gram(s) Oral once PRN  guaifenesin/dextromethorphan Oral Liquid 10 milliLiter(s) Oral every 4 hours PRN                                            ------------------------------------------------------------  VITAL SIGNS: Last 24 Hours  T(C): 36.4 (28 Dec 2022 04:37), Max: 38 (27 Dec 2022 19:39)  T(F): 97.5 (28 Dec 2022 04:37), Max: 100.4 (27 Dec 2022 19:39)  HR: 71 (28 Dec 2022 04:37) (67 - 76)  BP: 138/63 (28 Dec 2022 04:37) (122/59 - 143/63)  BP(mean): --  RR: 18 (28 Dec 2022 04:37) (18 - 18)  SpO2: 97% (28 Dec 2022 04:37) (97% - 97%)                                               LABS:                        12.2   9.87  )-----------( 201      ( 27 Dec 2022 08:36 )             35.8     12-27    137  |  96<L>  |  22<H>  ----------------------------<  203<H>  3.9   |  30  |  0.8    Ca    9.3      27 Dec 2022 08:36  Mg     2.2     12-27    TPro  6.8  /  Alb  4.6  /  TBili  0.5  /  DBili  x   /  AST  13  /  ALT  13  /  AlkPhos  91  12-26                  CARDIAC MARKERS ( 26 Dec 2022 12:00 )  x     / <0.01 ng/mL / x     / x     / x                                                  RADIOLOGY:        PHYSICAL EXAM:  GENERAL: NAD, lying in bed comfortably  HEAD:  Atraumatic, Normocephalic  EYES: conjunctiva and sclera clear  ENT: Moist mucous membranes  NECK: Supple   CHEST/LUNG: Clear to auscultation bilaterally; Unlabored respirations  HEART: Regular rate and rhythm;  ABDOMEN: Soft, nontender, nondistended  EXTREMITIES:  No clubbing, cyanosis. 2+ edema  NERVOUS SYSTEM:  A&Ox3, no focal deficits   SKIN: No rashes                                          ASSESSMENT & PLAN    67-year-old, M, with the aforementioned PMHx, presents with shortness of breath and cough     #Dyspnea/SOB  #Suspected CAP  - 92-93% on RA, increased to 94%-95% on 2L/min by NC  - Current Oxygen requirements: 94% on RA  - Chest Xray: My assessment: Right lower lobe opacity  - Leukocytosis, cough and CXR opacity --> treat CAP (Ceftriaxone and Azithromycin for now)  - Blood cultures, Sputum Cultures, Legionella Ag in the urine, Streptococcus Ag in the urine  - RVP: negative  - BNP: 485pg/mL. ( Underestimated due to patient's body habitus)  - Follow up TTE  - Troponin: <0.01  - Obtain EKG for QTc  - C/w Ceftriaxone and Azithromycin  - C/w Duonebs    #Left Red Eye   #Suspected Bacterial Conjunctivitis  - C/w Topical Antibiotic Eye drops  - Possible Non-typable H. influenzae causing conjunctivitis and pneumonia as well    #CAD:   - s/p PCI ( 7 stents ?), last in [place], in July 2022  - Carvedilol 25mg po q12hrs  - C/w Aspirin 81mg po once daily  - C/w Prasugrel 5mg po once daily  - C/w Losartan/HCTZ 100/25mg po once daily    #HTN:   - C/w Amlodipine 10mg po once daily   - Carvedilol 25mg po q12hrs  - C/w Losartan/HCTZ 100/25mg po once daily    #Type II DM:   - A1c  - Fingersticks pre- meals and fasting  -  Hold Metformin for now  - Insulin if FS >180    #HLD:   - C/w Rosuvastatin 20mg po once daily at bedtime    #Misc:   - DVT proph: Lovenox 40mg SubQ once daily  - GI proph: Pantoprazole 40mg po once daily  - Diet: DASH/TLC and CC  - Updated the patient's wife ( Joan) on the phone number: [phone].

## 2022-12-28 NOTE — DISCHARGE NOTE PROVIDER - NSDCCPCAREPLAN_GEN_ALL_CORE_FT
[facility]...
PRINCIPAL DISCHARGE DIAGNOSIS  Diagnosis: PNA (pneumonia)  Assessment and Plan of Treatment: You have pneumonia, which is an infection in your lungs. In the hospital, your health care providers helped you breathe better. They also gave you medicine to help your body get rid of the germs that cause pneumonia. They also made sure you got enough liquids and nutrients.      SECONDARY DISCHARGE DIAGNOSES  Diagnosis: SOB (shortness of breath)  Assessment and Plan of Treatment:

## 2022-12-29 ENCOUNTER — TRANSCRIPTION ENCOUNTER (OUTPATIENT)
Age: 67
End: 2022-12-29

## 2022-12-29 LAB
CULTURE RESULTS: SIGNIFICANT CHANGE UP
LEGIONELLA AG UR QL: NEGATIVE — SIGNIFICANT CHANGE UP
S PNEUM AG UR QL: NEGATIVE — SIGNIFICANT CHANGE UP
SPECIMEN SOURCE: SIGNIFICANT CHANGE UP

## 2023-01-01 LAB
CULTURE RESULTS: SIGNIFICANT CHANGE UP
SPECIMEN SOURCE: SIGNIFICANT CHANGE UP

## 2023-01-02 ENCOUNTER — TRANSCRIPTION ENCOUNTER (OUTPATIENT)
Age: 68
End: 2023-01-02

## 2023-01-10 ENCOUNTER — TRANSCRIPTION ENCOUNTER (OUTPATIENT)
Age: 68
End: 2023-01-10

## 2023-01-11 DIAGNOSIS — Z20.822 CONTACT WITH AND (SUSPECTED) EXPOSURE TO COVID-19: ICD-10-CM

## 2023-01-11 DIAGNOSIS — I10 ESSENTIAL (PRIMARY) HYPERTENSION: ICD-10-CM

## 2023-01-11 DIAGNOSIS — Z87.891 PERSONAL HISTORY OF NICOTINE DEPENDENCE: ICD-10-CM

## 2023-01-11 DIAGNOSIS — J12.9 VIRAL PNEUMONIA, UNSPECIFIED: ICD-10-CM

## 2023-01-11 DIAGNOSIS — J06.9 ACUTE UPPER RESPIRATORY INFECTION, UNSPECIFIED: ICD-10-CM

## 2023-01-11 DIAGNOSIS — B96.3 HEMOPHILUS INFLUENZAE [H. INFLUENZAE] AS THE CAUSE OF DISEASES CLASSIFIED ELSEWHERE: ICD-10-CM

## 2023-01-11 DIAGNOSIS — H10.89 OTHER CONJUNCTIVITIS: ICD-10-CM

## 2023-01-11 DIAGNOSIS — Z79.82 LONG TERM (CURRENT) USE OF ASPIRIN: ICD-10-CM

## 2023-01-11 DIAGNOSIS — Z95.5 PRESENCE OF CORONARY ANGIOPLASTY IMPLANT AND GRAFT: ICD-10-CM

## 2023-01-11 DIAGNOSIS — J14 PNEUMONIA DUE TO HEMOPHILUS INFLUENZAE: ICD-10-CM

## 2023-01-11 DIAGNOSIS — I25.10 ATHEROSCLEROTIC HEART DISEASE OF NATIVE CORONARY ARTERY WITHOUT ANGINA PECTORIS: ICD-10-CM

## 2023-01-11 DIAGNOSIS — Z79.84 LONG TERM (CURRENT) USE OF ORAL HYPOGLYCEMIC DRUGS: ICD-10-CM

## 2023-01-11 DIAGNOSIS — E78.5 HYPERLIPIDEMIA, UNSPECIFIED: ICD-10-CM

## 2023-01-11 DIAGNOSIS — Z79.02 LONG TERM (CURRENT) USE OF ANTITHROMBOTICS/ANTIPLATELETS: ICD-10-CM

## 2023-01-11 DIAGNOSIS — E11.9 TYPE 2 DIABETES MELLITUS WITHOUT COMPLICATIONS: ICD-10-CM

## 2023-01-18 ENCOUNTER — TRANSCRIPTION ENCOUNTER (OUTPATIENT)
Age: 68
End: 2023-01-18

## 2023-01-19 ENCOUNTER — NON-APPOINTMENT (OUTPATIENT)
Age: 68
End: 2023-01-19

## 2023-01-19 ENCOUNTER — APPOINTMENT (OUTPATIENT)
Age: 68
End: 2023-01-19
Payer: MEDICARE

## 2023-01-19 VITALS
SYSTOLIC BLOOD PRESSURE: 116 MMHG | WEIGHT: 252 LBS | RESPIRATION RATE: 14 BRPM | HEIGHT: 68 IN | OXYGEN SATURATION: 95 % | DIASTOLIC BLOOD PRESSURE: 62 MMHG | BODY MASS INDEX: 38.19 KG/M2 | HEART RATE: 65 BPM

## 2023-01-19 DIAGNOSIS — Z86.39 PERSONAL HISTORY OF OTHER ENDOCRINE, NUTRITIONAL AND METABOLIC DISEASE: ICD-10-CM

## 2023-01-19 DIAGNOSIS — I25.10 ATHEROSCLEROTIC HEART DISEASE OF NATIVE CORONARY ARTERY W/OUT ANGINA PECTORIS: ICD-10-CM

## 2023-01-19 DIAGNOSIS — Z86.79 PERSONAL HISTORY OF OTHER DISEASES OF THE CIRCULATORY SYSTEM: ICD-10-CM

## 2023-01-19 DIAGNOSIS — I10 ESSENTIAL (PRIMARY) HYPERTENSION: ICD-10-CM

## 2023-01-19 PROBLEM — Z00.00 ENCOUNTER FOR PREVENTIVE HEALTH EXAMINATION: Status: ACTIVE | Noted: 2023-01-19

## 2023-01-19 PROCEDURE — 99204 OFFICE O/P NEW MOD 45 MIN: CPT

## 2023-01-19 NOTE — DISCUSSION/SUMMARY
[FreeTextEntry1] : Status post pneumonia better chest x-ray blood test cultured were reviewed from the hospital 12/26\par COPD ex-smoker we will plan PFTs\par Weight loss\par Highly XANDER to let me know regarding the study\par Would recommend pulmonary rehab which she want to think about that

## 2023-01-19 NOTE — PHYSICAL EXAM
[No Acute Distress] : no acute distress [IV] : Mallampati Class: IV [Normal Appearance] : normal appearance [No Neck Mass] : no neck mass [Normal Rate/Rhythm] : normal rate/rhythm [Normal S1, S2] : normal s1, s2 [No Murmurs] : no murmurs [No Abnormalities] : no abnormalities [Benign] : benign [Normal Gait] : normal gait [No Cyanosis] : no cyanosis [FROM] : FROM [Normal Color/ Pigmentation] : normal color/ pigmentation [No Focal Deficits] : no focal deficits [Oriented x3] : oriented x3 [Normal Affect] : normal affect [TextBox_68] : DEC BS BOTH BASES [TextBox_105] : CHRONIC CHANGES LE

## 2023-01-19 NOTE — HISTORY OF PRESENT ILLNESS
[TextBox_4] : 67 years old presented for above.  Was admitted to the hospital on December 26, 2022 for 3 days history of cough shortness of breath.  He was diagnosed with pneumonia state few days in the hospital and was discharged on the antibiotic and came in for follow-up visit.  All over he feels better cough is gone shortness of breath improved.  Report report history of smoking for almost 25 to 30 years he stopped 20 years ago no history of COPD.  He is PMD BPH chest CT abdomen and pelvis on October 2022 which was small lung nodule and evidence of emphysema.  He gained weight he reports shortness of breath on exertion.  Intermittent wheezing.\par \par Reports also sleep apnea symptoms tired excessive daytime sleepiness he had sleep study done years ago he is not taking any treatment.

## 2023-08-01 ENCOUNTER — APPOINTMENT (OUTPATIENT)
Dept: PULMONOLOGY | Facility: CLINIC | Age: 68
End: 2023-08-01
Payer: MEDICARE

## 2023-08-01 ENCOUNTER — TRANSCRIPTION ENCOUNTER (OUTPATIENT)
Age: 68
End: 2023-08-01

## 2023-08-01 VITALS
HEIGHT: 68 IN | WEIGHT: 248 LBS | SYSTOLIC BLOOD PRESSURE: 132 MMHG | DIASTOLIC BLOOD PRESSURE: 60 MMHG | OXYGEN SATURATION: 92 % | HEART RATE: 62 BPM | BODY MASS INDEX: 37.59 KG/M2 | RESPIRATION RATE: 14 BRPM

## 2023-08-01 PROCEDURE — 94727 GAS DIL/WSHOT DETER LNG VOL: CPT

## 2023-08-01 PROCEDURE — 99213 OFFICE O/P EST LOW 20 MIN: CPT | Mod: 25

## 2023-08-01 PROCEDURE — 94729 DIFFUSING CAPACITY: CPT

## 2023-08-01 PROCEDURE — 94010 BREATHING CAPACITY TEST: CPT

## 2023-08-01 RX ORDER — ALBUTEROL SULFATE 90 UG/1
108 (90 BASE) INHALANT RESPIRATORY (INHALATION) EVERY 4 HOURS
Qty: 1 | Refills: 3 | Status: ACTIVE | COMMUNITY
Start: 2023-08-01 | End: 1900-01-01

## 2023-08-01 NOTE — PHYSICAL EXAM
[No Acute Distress] : no acute distress [Normal Oropharynx] : normal oropharynx [IV] : Mallampati Class: IV [Normal Appearance] : normal appearance [No Neck Mass] : no neck mass [Normal Rate/Rhythm] : normal rate/rhythm [Normal S1, S2] : normal s1, s2 [No Murmurs] : no murmurs [No Resp Distress] : no resp distress [Clear to Auscultation Bilaterally] : clear to auscultation bilaterally [No Abnormalities] : no abnormalities [Benign] : benign [Normal Gait] : normal gait [No Clubbing] : no clubbing [No Cyanosis] : no cyanosis [No Edema] : no edema [FROM] : FROM [Normal Color/ Pigmentation] : normal color/ pigmentation [No Focal Deficits] : no focal deficits [Oriented x3] : oriented x3 [Normal Affect] : normal affect [TextBox_68] : DEC BS BOTH BASES [TextBox_105] : CHRONIC CHANGES LE

## 2023-08-04 ENCOUNTER — APPOINTMENT (OUTPATIENT)
Dept: SLEEP CENTER | Facility: HOSPITAL | Age: 68
End: 2023-08-04
Payer: MEDICARE

## 2023-08-04 ENCOUNTER — OUTPATIENT (OUTPATIENT)
Dept: OUTPATIENT SERVICES | Facility: HOSPITAL | Age: 68
LOS: 1 days | Discharge: HOME | End: 2023-08-04
Payer: MEDICARE

## 2023-08-04 DIAGNOSIS — G47.33 OBSTRUCTIVE SLEEP APNEA (ADULT) (PEDIATRIC): ICD-10-CM

## 2023-08-04 PROCEDURE — 95800 SLP STDY UNATTENDED: CPT | Mod: 26

## 2023-08-04 PROCEDURE — 95806 SLEEP STUDY UNATT&RESP EFFT: CPT

## 2023-08-08 DIAGNOSIS — G47.33 OBSTRUCTIVE SLEEP APNEA (ADULT) (PEDIATRIC): ICD-10-CM

## 2023-08-24 ENCOUNTER — APPOINTMENT (OUTPATIENT)
Dept: PULMONOLOGY | Facility: CLINIC | Age: 68
End: 2023-08-24
Payer: MEDICARE

## 2023-08-24 PROCEDURE — 99212 OFFICE O/P EST SF 10 MIN: CPT | Mod: 95

## 2023-08-24 NOTE — DISCUSSION/SUMMARY
[FreeTextEntry1] : moderate COPD Moderate XANDER ( to call me when nack from [place] to order machine) Weight loss XANDER

## 2023-08-24 NOTE — REASON FOR VISIT
[Home] : at home, [unfilled] , at the time of the visit. [Medical Office: ([facility])___] : at the medical office located in  [Spouse] : spouse [Verbal consent obtained from patient] : the patient, [unfilled] [Follow-Up] : a follow-up visit [Sleep Apnea] : sleep apnea

## 2023-09-09 ENCOUNTER — INPATIENT (INPATIENT)
Facility: HOSPITAL | Age: 68
LOS: 2 days | Discharge: HOME | DRG: 178 | End: 2023-09-12
Attending: STUDENT IN AN ORGANIZED HEALTH CARE EDUCATION/TRAINING PROGRAM | Admitting: INTERNAL MEDICINE
Payer: MEDICARE

## 2023-09-09 VITALS
RESPIRATION RATE: 20 BRPM | DIASTOLIC BLOOD PRESSURE: 63 MMHG | HEART RATE: 72 BPM | HEIGHT: 68 IN | TEMPERATURE: 99 F | SYSTOLIC BLOOD PRESSURE: 136 MMHG | OXYGEN SATURATION: 94 % | WEIGHT: 250 LBS

## 2023-09-09 DIAGNOSIS — R09.02 HYPOXEMIA: ICD-10-CM

## 2023-09-09 LAB
ALBUMIN SERPL ELPH-MCNC: 4.5 G/DL — SIGNIFICANT CHANGE UP (ref 3.5–5.2)
ALP SERPL-CCNC: 76 U/L — SIGNIFICANT CHANGE UP (ref 30–115)
ALT FLD-CCNC: 11 U/L — SIGNIFICANT CHANGE UP (ref 0–41)
ANION GAP SERPL CALC-SCNC: 11 MMOL/L — SIGNIFICANT CHANGE UP (ref 7–14)
AST SERPL-CCNC: 15 U/L — SIGNIFICANT CHANGE UP (ref 0–41)
BASOPHILS # BLD AUTO: 0.04 K/UL — SIGNIFICANT CHANGE UP (ref 0–0.2)
BASOPHILS NFR BLD AUTO: 0.6 % — SIGNIFICANT CHANGE UP (ref 0–1)
BILIRUB SERPL-MCNC: 0.4 MG/DL — SIGNIFICANT CHANGE UP (ref 0.2–1.2)
BUN SERPL-MCNC: 23 MG/DL — HIGH (ref 10–20)
CALCIUM SERPL-MCNC: 9.2 MG/DL — SIGNIFICANT CHANGE UP (ref 8.4–10.5)
CHLORIDE SERPL-SCNC: 99 MMOL/L — SIGNIFICANT CHANGE UP (ref 98–110)
CO2 SERPL-SCNC: 27 MMOL/L — SIGNIFICANT CHANGE UP (ref 17–32)
CREAT SERPL-MCNC: 1.2 MG/DL — SIGNIFICANT CHANGE UP (ref 0.7–1.5)
CRP SERPL-MCNC: 36.2 MG/L — HIGH
D DIMER BLD IA.RAPID-MCNC: <150 NG/ML DDU — SIGNIFICANT CHANGE UP
EGFR: 65 ML/MIN/1.73M2 — SIGNIFICANT CHANGE UP
EOSINOPHIL # BLD AUTO: 0.13 K/UL — SIGNIFICANT CHANGE UP (ref 0–0.7)
EOSINOPHIL NFR BLD AUTO: 1.8 % — SIGNIFICANT CHANGE UP (ref 0–8)
FLUAV AG NPH QL: SIGNIFICANT CHANGE UP
FLUBV AG NPH QL: SIGNIFICANT CHANGE UP
GLUCOSE SERPL-MCNC: 130 MG/DL — HIGH (ref 70–99)
HCT VFR BLD CALC: 38.7 % — LOW (ref 42–52)
HGB BLD-MCNC: 12.8 G/DL — LOW (ref 14–18)
IMM GRANULOCYTES NFR BLD AUTO: 0.4 % — HIGH (ref 0.1–0.3)
LYMPHOCYTES # BLD AUTO: 0.98 K/UL — LOW (ref 1.2–3.4)
LYMPHOCYTES # BLD AUTO: 13.5 % — LOW (ref 20.5–51.1)
MCHC RBC-ENTMCNC: 30.3 PG — SIGNIFICANT CHANGE UP (ref 27–31)
MCHC RBC-ENTMCNC: 33.1 G/DL — SIGNIFICANT CHANGE UP (ref 32–37)
MCV RBC AUTO: 91.5 FL — SIGNIFICANT CHANGE UP (ref 80–94)
MONOCYTES # BLD AUTO: 1.15 K/UL — HIGH (ref 0.1–0.6)
MONOCYTES NFR BLD AUTO: 15.9 % — HIGH (ref 1.7–9.3)
NEUTROPHILS # BLD AUTO: 4.91 K/UL — SIGNIFICANT CHANGE UP (ref 1.4–6.5)
NEUTROPHILS NFR BLD AUTO: 67.8 % — SIGNIFICANT CHANGE UP (ref 42.2–75.2)
NRBC # BLD: 0 /100 WBCS — SIGNIFICANT CHANGE UP (ref 0–0)
NT-PROBNP SERPL-SCNC: 655 PG/ML — HIGH (ref 0–300)
PLATELET # BLD AUTO: 162 K/UL — SIGNIFICANT CHANGE UP (ref 130–400)
PMV BLD: 9.5 FL — SIGNIFICANT CHANGE UP (ref 7.4–10.4)
POTASSIUM SERPL-MCNC: 4.4 MMOL/L — SIGNIFICANT CHANGE UP (ref 3.5–5)
POTASSIUM SERPL-SCNC: 4.4 MMOL/L — SIGNIFICANT CHANGE UP (ref 3.5–5)
PROT SERPL-MCNC: 7.1 G/DL — SIGNIFICANT CHANGE UP (ref 6–8)
RBC # BLD: 4.23 M/UL — LOW (ref 4.7–6.1)
RBC # FLD: 14.6 % — HIGH (ref 11.5–14.5)
RSV RNA NPH QL NAA+NON-PROBE: SIGNIFICANT CHANGE UP
SARS-COV-2 RNA SPEC QL NAA+PROBE: DETECTED
SODIUM SERPL-SCNC: 137 MMOL/L — SIGNIFICANT CHANGE UP (ref 135–146)
WBC # BLD: 7.24 K/UL — SIGNIFICANT CHANGE UP (ref 4.8–10.8)
WBC # FLD AUTO: 7.24 K/UL — SIGNIFICANT CHANGE UP (ref 4.8–10.8)

## 2023-09-09 PROCEDURE — 85379 FIBRIN DEGRADATION QUANT: CPT

## 2023-09-09 PROCEDURE — 94640 AIRWAY INHALATION TREATMENT: CPT

## 2023-09-09 PROCEDURE — 82784 ASSAY IGA/IGD/IGG/IGM EACH: CPT

## 2023-09-09 PROCEDURE — 83520 IMMUNOASSAY QUANT NOS NONAB: CPT

## 2023-09-09 PROCEDURE — 36415 COLL VENOUS BLD VENIPUNCTURE: CPT

## 2023-09-09 PROCEDURE — 82728 ASSAY OF FERRITIN: CPT

## 2023-09-09 PROCEDURE — 85610 PROTHROMBIN TIME: CPT

## 2023-09-09 PROCEDURE — 84484 ASSAY OF TROPONIN QUANT: CPT

## 2023-09-09 PROCEDURE — 84478 ASSAY OF TRIGLYCERIDES: CPT

## 2023-09-09 PROCEDURE — 86140 C-REACTIVE PROTEIN: CPT

## 2023-09-09 PROCEDURE — 85384 FIBRINOGEN ACTIVITY: CPT

## 2023-09-09 PROCEDURE — 97162 PT EVAL MOD COMPLEX 30 MIN: CPT | Mod: GP

## 2023-09-09 PROCEDURE — 93010 ELECTROCARDIOGRAM REPORT: CPT

## 2023-09-09 PROCEDURE — 82962 GLUCOSE BLOOD TEST: CPT

## 2023-09-09 PROCEDURE — 83880 ASSAY OF NATRIURETIC PEPTIDE: CPT

## 2023-09-09 PROCEDURE — 93005 ELECTROCARDIOGRAM TRACING: CPT

## 2023-09-09 PROCEDURE — 71045 X-RAY EXAM CHEST 1 VIEW: CPT | Mod: 26

## 2023-09-09 PROCEDURE — 99285 EMERGENCY DEPT VISIT HI MDM: CPT | Mod: FS

## 2023-09-09 PROCEDURE — 83036 HEMOGLOBIN GLYCOSYLATED A1C: CPT

## 2023-09-09 PROCEDURE — 82565 ASSAY OF CREATININE: CPT

## 2023-09-09 PROCEDURE — 80076 HEPATIC FUNCTION PANEL: CPT

## 2023-09-09 PROCEDURE — 83615 LACTATE (LD) (LDH) ENZYME: CPT

## 2023-09-09 RX ORDER — ATORVASTATIN CALCIUM 80 MG/1
40 TABLET, FILM COATED ORAL AT BEDTIME
Refills: 0 | Status: DISCONTINUED | OUTPATIENT
Start: 2023-09-09 | End: 2023-09-12

## 2023-09-09 RX ORDER — CARVEDILOL PHOSPHATE 80 MG/1
25 CAPSULE, EXTENDED RELEASE ORAL ONCE
Refills: 0 | Status: COMPLETED | OUTPATIENT
Start: 2023-09-09 | End: 2023-09-09

## 2023-09-09 RX ADMIN — CARVEDILOL PHOSPHATE 25 MILLIGRAM(S): 80 CAPSULE, EXTENDED RELEASE ORAL at 20:36

## 2023-09-09 RX ADMIN — ATORVASTATIN CALCIUM 40 MILLIGRAM(S): 80 TABLET, FILM COATED ORAL at 20:36

## 2023-09-09 NOTE — ED PROVIDER NOTE - CLINICAL SUMMARY MEDICAL DECISION MAKING FREE TEXT BOX
Laboratory results reviewed and discussed with patient. CBC shows normal WBC count, Hb/Hct and platelet count are WNL. BMP shows electrolytes WNL and mildly elevated Bun/Cr.   ProBNP is elevated.   CXR reviewed by me and shows:  No PTx, no Free air, +bibasilar infiltrates.     Patient remained hemodynamically stable during the course of ED stay. Discussed with patient about the results of the diagnostic studies. Discussed with admitting physician and MAR, patient is admitted to Medicine for further evaluation and care.

## 2023-09-09 NOTE — ED ADULT NURSE NOTE - OBJECTIVE STATEMENT
tested covid positive today, sent for low pulse ox from [facility], pt AO x 4 , speaks in full sentences , no accessory muscles used , denies chest pain , headache , dizziness , abdominal pain , nausea, vomiting . Placed on COVID  isolation precaution

## 2023-09-09 NOTE — ED PROVIDER NOTE - PHYSICAL EXAMINATION
CONST: Well appearing in NAD  EYES: PERRL, EOMI, Sclera and conjunctiva clear.   ENT: No nasal discharge. Oropharynx normal appearing, no erythema or exudates. Uvula midline.  CARD: Normal S1 S2; Normal rate and rhythm  RESP: Equal BS B/L, diminished breath sounds bilaterally  GI: Soft, non-tender, non-distended.  MS: Normal ROM in all extremities. No midline spinal tenderness.  SKIN: Warm, dry, no acute rashes. Good turgor  NEURO: A&Ox3, No focal deficits. Strength 5/5 with no sensory deficits. Steady gait

## 2023-09-09 NOTE — ED PROVIDER NOTE - OBJECTIVE STATEMENT
69yo male with PMHx DM, CAD s/p 8 stents, COPD ex-smoker presents sent by urgent care for hypoxia and Covid (+) diagnosis today. Pt reports feeling weak with bodyaches and SOB today which prompted his urgent care visit. Worse on exertion and improves with resting. He denies chest pain, leg swelling, cough.

## 2023-09-09 NOTE — ED PROVIDER NOTE - DIFFERENTIAL DIAGNOSIS
Differential Diagnosis Electrolyte abnormalities, dehydration, DMITRI, hyperglycemia, hypoglycemia, symptomatic anemia.  r/o pneumonia,   r/o COVID-19

## 2023-09-09 NOTE — ED ADULT NURSE NOTE - NSFALLUNIVINTERV_ED_ALL_ED
Bed/Stretcher in lowest position, wheels locked, appropriate side rails in place/Call bell, personal items and telephone in reach/Instruct patient to call for assistance before getting out of bed/chair/stretcher/Non-slip footwear applied when patient is off stretcher/[place] to call system/Physically safe environment - no spills, clutter or unnecessary equipment/Purposeful proactive rounding/Room/bathroom lighting operational, light cord in reach

## 2023-09-10 LAB
ALBUMIN SERPL ELPH-MCNC: 4.3 G/DL — SIGNIFICANT CHANGE UP (ref 3.5–5.2)
ALP SERPL-CCNC: 71 U/L — SIGNIFICANT CHANGE UP (ref 30–115)
ALT FLD-CCNC: 12 U/L — SIGNIFICANT CHANGE UP (ref 0–41)
AST SERPL-CCNC: 14 U/L — SIGNIFICANT CHANGE UP (ref 0–41)
BILIRUB DIRECT SERPL-MCNC: <0.2 MG/DL — SIGNIFICANT CHANGE UP (ref 0–0.3)
BILIRUB INDIRECT FLD-MCNC: >0.2 MG/DL — SIGNIFICANT CHANGE UP (ref 0.2–1.2)
BILIRUB SERPL-MCNC: 0.4 MG/DL — SIGNIFICANT CHANGE UP (ref 0.2–1.2)
CREAT SERPL-MCNC: 1 MG/DL — SIGNIFICANT CHANGE UP (ref 0.7–1.5)
CRP SERPL-MCNC: 40.4 MG/L — HIGH
D DIMER BLD IA.RAPID-MCNC: 156 NG/ML DDU — SIGNIFICANT CHANGE UP
EGFR: 81 ML/MIN/1.73M2 — SIGNIFICANT CHANGE UP
FIBRINOGEN PPP-MCNC: 596 MG/DL — HIGH (ref 204.4–570.6)
GLUCOSE BLDC GLUCOMTR-MCNC: 353 MG/DL — HIGH (ref 70–99)
INR BLD: 0.97 RATIO — SIGNIFICANT CHANGE UP (ref 0.65–1.3)
LDH SERPL L TO P-CCNC: 141 U/L — SIGNIFICANT CHANGE UP (ref 50–242)
NT-PROBNP SERPL-SCNC: 338 PG/ML — HIGH (ref 0–300)
PROT SERPL-MCNC: 6.7 G/DL — SIGNIFICANT CHANGE UP (ref 6–8)
PROTHROM AB SERPL-ACNC: 11 SEC — SIGNIFICANT CHANGE UP (ref 9.95–12.87)
TRIGL SERPL-MCNC: 139 MG/DL — SIGNIFICANT CHANGE UP
TROPONIN T SERPL-MCNC: <0.01 NG/ML — SIGNIFICANT CHANGE UP

## 2023-09-10 PROCEDURE — 99223 1ST HOSP IP/OBS HIGH 75: CPT

## 2023-09-10 PROCEDURE — 93010 ELECTROCARDIOGRAM REPORT: CPT

## 2023-09-10 RX ORDER — AZITHROMYCIN 500 MG/1
250 TABLET, FILM COATED ORAL DAILY
Refills: 0 | Status: DISCONTINUED | OUTPATIENT
Start: 2023-09-10 | End: 2023-09-12

## 2023-09-10 RX ORDER — CARVEDILOL PHOSPHATE 80 MG/1
25 CAPSULE, EXTENDED RELEASE ORAL EVERY 12 HOURS
Refills: 0 | Status: DISCONTINUED | OUTPATIENT
Start: 2023-09-10 | End: 2023-09-12

## 2023-09-10 RX ORDER — POLYETHYLENE GLYCOL 3350 17 G/17G
17 POWDER, FOR SOLUTION ORAL DAILY
Refills: 0 | Status: DISCONTINUED | OUTPATIENT
Start: 2023-09-10 | End: 2023-09-12

## 2023-09-10 RX ORDER — LOSARTAN POTASSIUM 100 MG/1
100 TABLET, FILM COATED ORAL DAILY
Refills: 0 | Status: DISCONTINUED | OUTPATIENT
Start: 2023-09-10 | End: 2023-09-12

## 2023-09-10 RX ORDER — SODIUM CHLORIDE 9 MG/ML
1000 INJECTION, SOLUTION INTRAVENOUS
Refills: 0 | Status: DISCONTINUED | OUTPATIENT
Start: 2023-09-10 | End: 2023-09-12

## 2023-09-10 RX ORDER — ENOXAPARIN SODIUM 100 MG/ML
40 INJECTION SUBCUTANEOUS EVERY 12 HOURS
Refills: 0 | Status: DISCONTINUED | OUTPATIENT
Start: 2023-09-10 | End: 2023-09-10

## 2023-09-10 RX ORDER — PANTOPRAZOLE SODIUM 20 MG/1
40 TABLET, DELAYED RELEASE ORAL
Refills: 0 | Status: DISCONTINUED | OUTPATIENT
Start: 2023-09-10 | End: 2023-09-12

## 2023-09-10 RX ORDER — DEXTROSE 50 % IN WATER 50 %
25 SYRINGE (ML) INTRAVENOUS ONCE
Refills: 0 | Status: DISCONTINUED | OUTPATIENT
Start: 2023-09-10 | End: 2023-09-12

## 2023-09-10 RX ORDER — ENOXAPARIN SODIUM 100 MG/ML
40 INJECTION SUBCUTANEOUS EVERY 24 HOURS
Refills: 0 | Status: DISCONTINUED | OUTPATIENT
Start: 2023-09-11 | End: 2023-09-12

## 2023-09-10 RX ORDER — DEXTROSE 50 % IN WATER 50 %
12.5 SYRINGE (ML) INTRAVENOUS ONCE
Refills: 0 | Status: DISCONTINUED | OUTPATIENT
Start: 2023-09-10 | End: 2023-09-12

## 2023-09-10 RX ORDER — DEXAMETHASONE 0.5 MG/5ML
6 ELIXIR ORAL DAILY
Refills: 0 | Status: DISCONTINUED | OUTPATIENT
Start: 2023-09-10 | End: 2023-09-12

## 2023-09-10 RX ORDER — DEXAMETHASONE 0.5 MG/5ML
6 ELIXIR ORAL DAILY
Refills: 0 | Status: DISCONTINUED | OUTPATIENT
Start: 2023-09-10 | End: 2023-09-10

## 2023-09-10 RX ORDER — ALBUTEROL 90 UG/1
2 AEROSOL, METERED ORAL EVERY 6 HOURS
Refills: 0 | Status: DISCONTINUED | OUTPATIENT
Start: 2023-09-10 | End: 2023-09-12

## 2023-09-10 RX ORDER — INSULIN LISPRO 100/ML
VIAL (ML) SUBCUTANEOUS AT BEDTIME
Refills: 0 | Status: DISCONTINUED | OUTPATIENT
Start: 2023-09-10 | End: 2023-09-12

## 2023-09-10 RX ORDER — ACETAMINOPHEN 500 MG
650 TABLET ORAL EVERY 6 HOURS
Refills: 0 | Status: DISCONTINUED | OUTPATIENT
Start: 2023-09-10 | End: 2023-09-12

## 2023-09-10 RX ORDER — GLUCAGON INJECTION, SOLUTION 0.5 MG/.1ML
1 INJECTION, SOLUTION SUBCUTANEOUS ONCE
Refills: 0 | Status: DISCONTINUED | OUTPATIENT
Start: 2023-09-10 | End: 2023-09-12

## 2023-09-10 RX ORDER — INFLUENZA VIRUS VACCINE 15; 15; 15; 15 UG/.5ML; UG/.5ML; UG/.5ML; UG/.5ML
0.7 SUSPENSION INTRAMUSCULAR ONCE
Refills: 0 | Status: DISCONTINUED | OUTPATIENT
Start: 2023-09-10 | End: 2023-09-12

## 2023-09-10 RX ORDER — PRASUGREL 5 MG/1
5 TABLET, FILM COATED ORAL DAILY
Refills: 0 | Status: DISCONTINUED | OUTPATIENT
Start: 2023-09-10 | End: 2023-09-12

## 2023-09-10 RX ORDER — AMLODIPINE BESYLATE 2.5 MG/1
10 TABLET ORAL AT BEDTIME
Refills: 0 | Status: DISCONTINUED | OUTPATIENT
Start: 2023-09-10 | End: 2023-09-12

## 2023-09-10 RX ORDER — REMDESIVIR 5 MG/ML
200 INJECTION INTRAVENOUS EVERY 24 HOURS
Refills: 0 | Status: COMPLETED | OUTPATIENT
Start: 2023-09-10 | End: 2023-09-10

## 2023-09-10 RX ORDER — LANOLIN ALCOHOL/MO/W.PET/CERES
3 CREAM (GRAM) TOPICAL AT BEDTIME
Refills: 0 | Status: DISCONTINUED | OUTPATIENT
Start: 2023-09-10 | End: 2023-09-12

## 2023-09-10 RX ORDER — DEXTROSE 50 % IN WATER 50 %
15 SYRINGE (ML) INTRAVENOUS ONCE
Refills: 0 | Status: DISCONTINUED | OUTPATIENT
Start: 2023-09-10 | End: 2023-09-12

## 2023-09-10 RX ORDER — SENNA PLUS 8.6 MG/1
2 TABLET ORAL AT BEDTIME
Refills: 0 | Status: DISCONTINUED | OUTPATIENT
Start: 2023-09-10 | End: 2023-09-12

## 2023-09-10 RX ORDER — REMDESIVIR 5 MG/ML
INJECTION INTRAVENOUS
Refills: 0 | Status: DISCONTINUED | OUTPATIENT
Start: 2023-09-10 | End: 2023-09-12

## 2023-09-10 RX ORDER — IPRATROPIUM/ALBUTEROL SULFATE 18-103MCG
3 AEROSOL WITH ADAPTER (GRAM) INHALATION EVERY 6 HOURS
Refills: 0 | Status: DISCONTINUED | OUTPATIENT
Start: 2023-09-10 | End: 2023-09-12

## 2023-09-10 RX ORDER — ASPIRIN/CALCIUM CARB/MAGNESIUM 324 MG
81 TABLET ORAL DAILY
Refills: 0 | Status: DISCONTINUED | OUTPATIENT
Start: 2023-09-10 | End: 2023-09-12

## 2023-09-10 RX ORDER — INSULIN LISPRO 100/ML
VIAL (ML) SUBCUTANEOUS
Refills: 0 | Status: DISCONTINUED | OUTPATIENT
Start: 2023-09-10 | End: 2023-09-12

## 2023-09-10 RX ORDER — REMDESIVIR 5 MG/ML
100 INJECTION INTRAVENOUS EVERY 24 HOURS
Refills: 0 | Status: DISCONTINUED | OUTPATIENT
Start: 2023-09-11 | End: 2023-09-12

## 2023-09-10 RX ADMIN — Medication 650 MILLIGRAM(S): at 10:45

## 2023-09-10 RX ADMIN — AZITHROMYCIN 250 MILLIGRAM(S): 500 TABLET, FILM COATED ORAL at 22:20

## 2023-09-10 RX ADMIN — SENNA PLUS 2 TABLET(S): 8.6 TABLET ORAL at 22:20

## 2023-09-10 RX ADMIN — Medication 3 MILLILITER(S): at 15:16

## 2023-09-10 RX ADMIN — ATORVASTATIN CALCIUM 40 MILLIGRAM(S): 80 TABLET, FILM COATED ORAL at 22:20

## 2023-09-10 RX ADMIN — Medication 81 MILLIGRAM(S): at 14:43

## 2023-09-10 RX ADMIN — ENOXAPARIN SODIUM 40 MILLIGRAM(S): 100 INJECTION SUBCUTANEOUS at 05:49

## 2023-09-10 RX ADMIN — LOSARTAN POTASSIUM 100 MILLIGRAM(S): 100 TABLET, FILM COATED ORAL at 05:50

## 2023-09-10 RX ADMIN — Medication 600 MILLIGRAM(S): at 18:01

## 2023-09-10 RX ADMIN — PANTOPRAZOLE SODIUM 40 MILLIGRAM(S): 20 TABLET, DELAYED RELEASE ORAL at 05:50

## 2023-09-10 RX ADMIN — PRASUGREL 5 MILLIGRAM(S): 5 TABLET, FILM COATED ORAL at 14:43

## 2023-09-10 RX ADMIN — Medication 6: at 22:20

## 2023-09-10 RX ADMIN — REMDESIVIR 200 MILLIGRAM(S): 5 INJECTION INTRAVENOUS at 03:59

## 2023-09-10 RX ADMIN — Medication 3 MILLIGRAM(S): at 03:56

## 2023-09-10 RX ADMIN — ALBUTEROL 2 PUFF(S): 90 AEROSOL, METERED ORAL at 03:57

## 2023-09-10 RX ADMIN — AMLODIPINE BESYLATE 10 MILLIGRAM(S): 2.5 TABLET ORAL at 22:20

## 2023-09-10 RX ADMIN — CARVEDILOL PHOSPHATE 25 MILLIGRAM(S): 80 CAPSULE, EXTENDED RELEASE ORAL at 05:49

## 2023-09-10 RX ADMIN — Medication 6 MILLIGRAM(S): at 10:44

## 2023-09-10 RX ADMIN — CARVEDILOL PHOSPHATE 25 MILLIGRAM(S): 80 CAPSULE, EXTENDED RELEASE ORAL at 18:01

## 2023-09-10 NOTE — H&P ADULT - ASSESSMENT
69 y/o man w PMHx of HTN, DM2, CAD s/p stents x8, unclear when most recent stent was, COPD w 90 pack year hx, stopped smoking ~2003, developed few days of SOB and dry cough, went to [facility] where he had COVID(+) test and hypoxia, referred to ED where he was placed on supplemental O2 for 95% on RA at rest and 90% ambulatory, labs notable for Cr 1.2, WBC 7.24, admitted to medicine for management of COVID.   HCP wife Joan John [phone]     MED REC INCOMPLETE ***pt unable to give me meds or pharmacy (did not know name, address, phone number). Insists that his wife can bring the medication list in am. Current meds ordered are from d/c on 12/2022. pt denied being on any AC/antiplatelet, seizure medications, or benzodiazepines but also unable to remember any of his medications...     #COVID(+)   #COPD   (+)SOB and currently on supplemental O2 but has h/o COPD so O2 goal is 88-92%  dimer <150, will not complete VA duplex   - ID consulted for additional recs  - RDV x5 days started  - Hold dexamethasone for now. Has been documented to be 99% on 2L NC but later 94% on 2L NC. If continues to require O2 in am to establish 88-92%, may benefit from dexamethasone   - Ambulatory O2 sat  - Lovenox 40mg BID as per COVID treatment protocols for non ICU/ CrCl>15/ BMI>30    #HTN - cont meds from d/c of 12/2022  #CAD s/p stents x8   - cont meds from d/c of 12/2022  - echo 12/2022 w EF 61% and AV stenosis    #DM2  States only takes oral antidiabetics, no insulin   SS insulin for now                                                                               ----------------------------------------------------  # DVT prophylaxis: Lovenox 40mg BID for COVID   # GI prophylaxis: Pantoprazole 40mg QD   # Diet: DASH TLC 1200cc +glucerna   # Activity: Ambulate as Tolerated   # Code status: FULL CODE   # Disposition: From home                                                                            --------------------------------------------------------    # Shola:   - med rec  - ID Consult   - Wean O2 as tolerated  - Ambulatory O2 sats

## 2023-09-10 NOTE — H&P ADULT - CONVERSATION DETAILS
Explained to pt that unless otherwise stated, in a hospital everyone is treated as full code, meaning if needed in an emergency, compressions are done when a pt's heart does not beat on its own and intubation and ventilation are done when a pt cannot breathe on their own. Asked whether this is something that pt would want if ever required as a life sustaining measure.   Full code at this time.     Asked whether pt has anyone who they would want to make their healthcare decisions for them if they were too confused, not awake, or otherwise unable to do so.  HCP wife Joan John [phone]

## 2023-09-10 NOTE — CONSULT NOTE ADULT - SUBJECTIVE AND OBJECTIVE BOX
BRAD SAM  68y, Male  Allergy: No Known Allergies      All historical available data reviewed.    HPI:  67 y/o man w PMHx of HTN, DM2, CAD s/p stents x8, unclear when most recent stent was, COPD w 90 pack year hx, stopped smoking ~2003, developed few days of SOB and dry cough, went to [facility] where he had COVID(+) test and hypoxia, referred to ED where he was placed on supplemental O2 for 95% on RA at rest and 90% ambulatory, labs notable for Cr 1.2, WBC 7.24, admitted to medicine for management of COVID.   HCP wife Joan Sam [phone]     States he has developed dry cough x few days, no known sick contacts but was at a gathering a few days ago. (+)myalgias and SOB as well, though now both feeling improved at time of admission. Does not require supplemental O2 at baseline. Does not know his medications, states his wife will bring the list tomorrow and asked me to not call her at this late hour. On ROS also endorsed some abd distension. No CP, n/v/d. Is retired now but previously worked in construction.     Triage vitals were: 136/63, 72bpm, RR 20, 94% on RA, 99.1F   Labs notable for: CRP 36.2, , Cr 1.2, WBC 7.24  Imaging: CXR    (10 Sep 2023 02:33)    FAMILY HISTORY:    PAST MEDICAL & SURGICAL HISTORY:  HTN (hypertension)      DM2 (diabetes mellitus, type 2)      COPD exacerbation      CAD (coronary artery disease)      2019 novel coronavirus disease (COVID-19)            VITALS:  T(F): 98.2, Max: 99.1 (09-09-23 @ 18:39)  HR: 65  BP: 119/57  RR: 18Vital Signs Last 24 Hrs  T(C): 36.8 (10 Sep 2023 07:37), Max: 37.3 (09 Sep 2023 18:39)  T(F): 98.2 (10 Sep 2023 07:37), Max: 99.1 (09 Sep 2023 18:39)  HR: 65 (10 Sep 2023 07:37) (63 - 73)  BP: 119/57 (10 Sep 2023 07:37) (103/51 - 136/63)  BP(mean): 96 (10 Sep 2023 04:04) (89 - 96)  RR: 18 (10 Sep 2023 07:37) (18 - 20)  SpO2: 96% (10 Sep 2023 07:37) (91% - 99%)    Parameters below as of 10 Sep 2023 07:37  Patient On (Oxygen Delivery Method): nasal cannula        TESTS & MEASUREMENTS:                        12.8   7.24  )-----------( 162      ( 09 Sep 2023 21:08 )             38.7     09-10    x   |  x   |  x   ----------------------------<  x   x    |  x   |  1.0    Ca    9.2      09 Sep 2023 21:08    TPro  6.7  /  Alb  4.3  /  TBili  0.4  /  DBili  <0.2  /  AST  14  /  ALT  12  /  AlkPhos  71  09-10    LIVER FUNCTIONS - ( 10 Sep 2023 06:05 )  Alb: 4.3 g/dL / Pro: 6.7 g/dL / ALK PHOS: 71 U/L / ALT: 12 U/L / AST: 14 U/L / GGT: x             Urinalysis Basic - ( 09 Sep 2023 21:08 )    Color: x / Appearance: x / SG: x / pH: x  Gluc: 130 mg/dL / Ketone: x  / Bili: x / Urobili: x   Blood: x / Protein: x / Nitrite: x   Leuk Esterase: x / RBC: x / WBC x   Sq Epi: x / Non Sq Epi: x / Bacteria: x          RADIOLOGY & ADDITIONAL TESTS:  Personal review of radiological diagnostics performed  Echo and EKG results noted when applicable.     MEDICATIONS:  acetaminophen     Tablet .. 650 milliGRAM(s) Oral every 6 hours PRN  albuterol    90 MICROgram(s) HFA Inhaler 2 Puff(s) Inhalation every 6 hours PRN  amLODIPine   Tablet 10 milliGRAM(s) Oral at bedtime  atorvastatin 40 milliGRAM(s) Oral at bedtime  carvedilol 25 milliGRAM(s) Oral every 12 hours  dexAMETHasone  Injectable 6 milliGRAM(s) IV Push daily  enoxaparin Injectable 40 milliGRAM(s) SubCutaneous every 12 hours  losartan 100 milliGRAM(s) Oral daily  melatonin 3 milliGRAM(s) Oral at bedtime PRN  pantoprazole    Tablet 40 milliGRAM(s) Oral before breakfast  polyethylene glycol 3350 17 Gram(s) Oral daily  remdesivir  IVPB   IV Intermittent   senna 2 Tablet(s) Oral at bedtime      ANTIBIOTICS:  remdesivir  IVPB   IV Intermittent

## 2023-09-10 NOTE — H&P ADULT - HISTORY OF PRESENT ILLNESS
67 y/o man w PMHx of HTN, DM2, CAD s/p stents x8, unclear when most recent stent was, COPD w 90 pack year hx, stopped smoking ~2003, developed few days of SOB and dry cough, went to [facility] where he had COVID(+) test and hypoxia, referred to ED where he was placed on supplemental O2 for 95% on RA at rest and 90% ambulatory, labs notable for Cr 1.2, WBC 7.24, admitted to medicine for management of COVID.   HCP wife Joan John [phone]     States he has developed dry cough x few days, no known sick contacts but was at a gathering a few days ago. (+)myalgias and SOB as well, though now both feeling improved at time of admission. Does not require supplemental O2 at baseline. Does not know his medications, states his wife will bring the list tomorrow and asked me to not call her at this late hour. On ROS also endorsed some abd distension. No CP, n/v/d. Is retired now but previously worked in construction.     Triage vitals were: 136/63, 72bpm, RR 20, 94% on RA, 99.1F   Labs notable for: CRP 36.2, , Cr 1.2, WBC 7.24  Imaging: CXR

## 2023-09-10 NOTE — H&P ADULT - ATTENDING COMMENTS
69 y/o man w PMHx of HTN, DM2, CAD s/p stents x8, unclear when most recent stent was, COPD w 90 pack year hx, stopped smoking ~2003, developed few days of SOB and dry cough, went to [facility] where he had COVID(+) test and hypoxia, referred to ED where he was placed on supplemental O2 for 95% on RA at rest and 90% ambulatory, labs notable for Cr 1.2, WBC 7.24, admitted to medicine for management of COVID.   HCP wife Joan John [phone]     MED REC INCOMPLETE ***pt unable to give me meds or pharmacy (did not know name, address, phone number). Insists that his wife can bring the medication list in am. Current meds ordered are from d/c on 12/2022. pt denied being on any AC/antiplatelet, seizure medications, or benzodiazepines but also unable to remember any of his medications...       1. Acute COPD exacerbation triggered by COVID 19    -Admit ot medicine               - ECG:  reported nl              - CXR: wnl                  -CRP elevated          - D-dimer negative   - Start Remdisivir   - Start Dexamethasone 6mg IV daily d:1   - Lovenox 40mg BID as per COVID treatment protocols for non ICU/ CrCl>15/ BMI>30  - Oxygen protocol   - ID consult     2. HTN - cont meds from d/c of 12/2022    3. CAD s/p stents x8   - cont meds from d/c of 12/2022  - echo 12/2022 w EF 61% and AV stenosis    4. DM2  States only takes oral antidiabetics, no insulin   SS insulin for now                                                                               ----------------------------------------------------  # DVT prophylaxis: Lovenox 40mg BID for COVID   # GI prophylaxis: Pantoprazole 40mg QD   # Diet: DASH TLC 1200cc +glucerna   # Activity: Ambulate as Tolerated   # Code status: FULL CODE   # Disposition: From home 67 y/o man w PMHx of HTN, DM2, CAD s/p stents x8, unclear when most recent stent was, COPD w 90 pack year hx, stopped smoking ~2003, developed few days of SOB and dry cough, went to [facility] where he had COVID(+) test and hypoxia, referred to ED where he was placed on supplemental O2 for 95% on RA at rest and 90% ambulatory, labs notable for Cr 1.2, WBC 7.24, admitted to medicine for management of COVID.   HCP wife Joan John [phone]         1. Acute COPD exacerbation triggered by COVID 19 resolving    -Admit ot medicine               - ECG:  reported nl              - CXR: wnl                  -CRP elevated          - D-dimer negative   - Start Remdisivir d:1  - Start Dexamethasone 6mg IV daily d:1 (might need higher dose if wheezing persist)   - Lovenox 40mg daily as pt is on dual antiplatelets   - Start azithromycin  - Start inhalers   - Oxygen protocol   - ID consult appreciated     2. HTN   -Cont HCTZ     3. CAD s/p stents x8 . hx of mild aortic stenosis   - Cont aspirin and prasugrel 5mg po daily   - Cont carvedilol 25mg po bid   - Cont losartan   - Cont atorvastatin   - echo 12/2022 w EF 61% and AV stenosis    4. DM2  States only takes oral antidiabetics, no insulin   SS insulin for now                                                                               ----------------------------------------------------  # DVT prophylaxis: Lovenox 40mg daily   # GI prophylaxis: Pantoprazole 40mg QD   # Diet: DASH TLC 1200cc +glucerna   # Activity: Ambulate as Tolerated   # Code status: FULL CODE   # Disposition: From home    Patient PMD retired and ask for meds refil wich is reasonable to give him 3 refill upon dc as family is having hard time getting a PMD appointment.

## 2023-09-10 NOTE — PATIENT PROFILE ADULT - NSPROPASSIVESMOKEEXPOSURE_GEN_A_NUR
Pt requesting refill of   Requested Prescriptions     Pending Prescriptions Disp Refills   • LEVOTHYROXINE SODIUM 88 MCG Oral Tab [Pharmacy Med Name: LEVOTHYROXINE 0.088MG (88MCG) TAB] 90 tablet 0     Sig: TAKE ONE TABLET BY MOUTH BEFORE BREAKFAST   • AT No

## 2023-09-10 NOTE — CONSULT NOTE ADULT - ASSESSMENT
69 y/o man w PMHx of HTN, DM2, CAD s/p stents x8, unclear when most recent stent was, COPD w 90 pack year hx, stopped smoking ~2003, developed few days of SOB and dry cough, went to [facility] where he had COVID(+) test and hypoxia, referred to ED where he was placed on supplemental O2 for 95% on RA at rest and 90% ambulatory, labs notable for Cr 1.2, WBC 7.24, admitted to medicine for management of COVID.     IMPRESSION   COVID-19 with hypoxia  CXR few GGO  Clinically improved  S/p vaccination and one booster.   Pt is in the early viral replicative phase based on the timeline/onset of symptoms.  No cytokine response  No bacterial PNA      RECOMMENDATIONS;   Day 1 : Single  mg IV loading dose  Day 2-5 : single  mg IV once daily maintenance dose  Daily CBC,CMP. Dexamethasone 6 mg iv q24h for 10 days.  Monitor for side effects: hyperglycemia, neurological ( agitation/confusion), adrenal suppression, bacterial and fungal infections   No steroids on discharge  -Off loading to prevent pressure sores and preventive measures to avoid aspiration  Please do not hesitate to recall ID if any questions arise either through "[employer] or through [employer] teams

## 2023-09-10 NOTE — H&P ADULT - NSHPSOCIALHISTORY_GEN_ALL_CORE
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

## 2023-09-10 NOTE — H&P ADULT - NSICDXPASTMEDICALHX_GEN_ALL_CORE_FT
PAST MEDICAL HISTORY:  2019 novel coronavirus disease (COVID-19)     CAD (coronary artery disease)     COPD exacerbation     DM2 (diabetes mellitus, type 2)     HTN (hypertension)

## 2023-09-10 NOTE — H&P ADULT - NSHPPHYSICALEXAM_GEN_ALL_CORE
GENERAL: NAD, lying in bed comfortably  HEAD:  Atraumatic, Normocephalic  EYES: EOMI, sclera clear  ENT: Moist mucous membranes. 2L NC in place.   NECK: Supple, trachea midline  CHEST/LUNG: Diffuse wheezing but unlabored respirations and seems comfortable at this time   HEART: Regular rate and rhythm; No appreciable murmurs, rubs, or gallops  ABDOMEN: (+)BS; Soft, nontender, distended but not tense   EXTREMITIES:  2+ Radial Pulses, brisk capillary refill. 1+ edema to BLE, chronic venous stasis changes   NERVOUS SYSTEM:  A&Ox3, no noted facial droop. Moving all extremities w/o difficulty.   SKIN: No rashes or lesions to b/l forearm, face.

## 2023-09-11 ENCOUNTER — TRANSCRIPTION ENCOUNTER (OUTPATIENT)
Age: 68
End: 2023-09-11

## 2023-09-11 LAB
A1C WITH ESTIMATED AVERAGE GLUCOSE RESULT: 7.1 % — HIGH (ref 4–5.6)
ALBUMIN SERPL ELPH-MCNC: 4.2 G/DL — SIGNIFICANT CHANGE UP (ref 3.5–5.2)
ALP SERPL-CCNC: 81 U/L — SIGNIFICANT CHANGE UP (ref 30–115)
ALT FLD-CCNC: 14 U/L — SIGNIFICANT CHANGE UP (ref 0–41)
AST SERPL-CCNC: 15 U/L — SIGNIFICANT CHANGE UP (ref 0–41)
BILIRUB DIRECT SERPL-MCNC: <0.2 MG/DL — SIGNIFICANT CHANGE UP (ref 0–0.3)
BILIRUB INDIRECT FLD-MCNC: >0 MG/DL — LOW (ref 0.2–1.2)
BILIRUB SERPL-MCNC: 0.2 MG/DL — SIGNIFICANT CHANGE UP (ref 0.2–1.2)
CREAT SERPL-MCNC: 1.1 MG/DL — SIGNIFICANT CHANGE UP (ref 0.7–1.5)
CRP SERPL-MCNC: 37.3 MG/L — HIGH
EGFR: 72 ML/MIN/1.73M2 — SIGNIFICANT CHANGE UP
ESTIMATED AVERAGE GLUCOSE: 157 MG/DL — HIGH (ref 68–114)
FERRITIN SERPL-MCNC: 103 NG/ML — SIGNIFICANT CHANGE UP (ref 30–400)
FERRITIN SERPL-MCNC: 115 NG/ML — SIGNIFICANT CHANGE UP (ref 30–400)
FERRITIN SERPL-MCNC: 141 NG/ML — SIGNIFICANT CHANGE UP (ref 30–400)
GLUCOSE BLDC GLUCOMTR-MCNC: 247 MG/DL — HIGH (ref 70–99)
GLUCOSE BLDC GLUCOMTR-MCNC: 254 MG/DL — HIGH (ref 70–99)
GLUCOSE BLDC GLUCOMTR-MCNC: 297 MG/DL — HIGH (ref 70–99)
GLUCOSE BLDC GLUCOMTR-MCNC: 301 MG/DL — HIGH (ref 70–99)
GLUCOSE BLDC GLUCOMTR-MCNC: 335 MG/DL — HIGH (ref 70–99)
IGA FLD-MCNC: 170 MG/DL — SIGNIFICANT CHANGE UP (ref 84–499)
IGG FLD-MCNC: 985 MG/DL — SIGNIFICANT CHANGE UP (ref 610–1660)
IGM SERPL-MCNC: 49 MG/DL — SIGNIFICANT CHANGE UP (ref 35–242)
INR BLD: 0.97 RATIO — SIGNIFICANT CHANGE UP (ref 0.65–1.3)
KAPPA LC SER QL IFE: 2.67 MG/DL — HIGH (ref 0.33–1.94)
KAPPA/LAMBDA FREE LIGHT CHAIN RATIO, SERUM: 1.58 RATIO — SIGNIFICANT CHANGE UP (ref 0.26–1.65)
LAMBDA LC SER QL IFE: 1.69 MG/DL — SIGNIFICANT CHANGE UP (ref 0.57–2.63)
LDH SERPL L TO P-CCNC: 164 U/L — SIGNIFICANT CHANGE UP (ref 50–242)
PROCALCITONIN SERPL-MCNC: 0.06 NG/ML — SIGNIFICANT CHANGE UP (ref 0.02–0.1)
PROT SERPL-MCNC: 6.9 G/DL — SIGNIFICANT CHANGE UP (ref 6–8)
PROTHROM AB SERPL-ACNC: 11 SEC — SIGNIFICANT CHANGE UP (ref 9.95–12.87)

## 2023-09-11 PROCEDURE — 99232 SBSQ HOSP IP/OBS MODERATE 35: CPT

## 2023-09-11 RX ORDER — INSULIN GLARGINE 100 [IU]/ML
12 INJECTION, SOLUTION SUBCUTANEOUS ONCE
Refills: 0 | Status: COMPLETED | OUTPATIENT
Start: 2023-09-11 | End: 2023-09-11

## 2023-09-11 RX ORDER — INSULIN GLARGINE 100 [IU]/ML
15 INJECTION, SOLUTION SUBCUTANEOUS AT BEDTIME
Refills: 0 | Status: DISCONTINUED | OUTPATIENT
Start: 2023-09-11 | End: 2023-09-12

## 2023-09-11 RX ADMIN — Medication 600 MILLIGRAM(S): at 06:03

## 2023-09-11 RX ADMIN — Medication 6: at 08:07

## 2023-09-11 RX ADMIN — PANTOPRAZOLE SODIUM 40 MILLIGRAM(S): 20 TABLET, DELAYED RELEASE ORAL at 06:02

## 2023-09-11 RX ADMIN — Medication 6 MILLIGRAM(S): at 06:03

## 2023-09-11 RX ADMIN — AMLODIPINE BESYLATE 10 MILLIGRAM(S): 2.5 TABLET ORAL at 21:59

## 2023-09-11 RX ADMIN — PRASUGREL 5 MILLIGRAM(S): 5 TABLET, FILM COATED ORAL at 11:43

## 2023-09-11 RX ADMIN — INSULIN GLARGINE 15 UNIT(S): 100 INJECTION, SOLUTION SUBCUTANEOUS at 22:05

## 2023-09-11 RX ADMIN — AZITHROMYCIN 250 MILLIGRAM(S): 500 TABLET, FILM COATED ORAL at 11:43

## 2023-09-11 RX ADMIN — Medication 6: at 16:24

## 2023-09-11 RX ADMIN — INSULIN GLARGINE 12 UNIT(S): 100 INJECTION, SOLUTION SUBCUTANEOUS at 11:42

## 2023-09-11 RX ADMIN — Medication 4: at 22:04

## 2023-09-11 RX ADMIN — Medication 8: at 11:43

## 2023-09-11 RX ADMIN — POLYETHYLENE GLYCOL 3350 17 GRAM(S): 17 POWDER, FOR SOLUTION ORAL at 11:45

## 2023-09-11 RX ADMIN — ENOXAPARIN SODIUM 40 MILLIGRAM(S): 100 INJECTION SUBCUTANEOUS at 06:02

## 2023-09-11 RX ADMIN — Medication 600 MILLIGRAM(S): at 17:16

## 2023-09-11 RX ADMIN — ATORVASTATIN CALCIUM 40 MILLIGRAM(S): 80 TABLET, FILM COATED ORAL at 21:59

## 2023-09-11 RX ADMIN — LOSARTAN POTASSIUM 100 MILLIGRAM(S): 100 TABLET, FILM COATED ORAL at 06:03

## 2023-09-11 RX ADMIN — CARVEDILOL PHOSPHATE 25 MILLIGRAM(S): 80 CAPSULE, EXTENDED RELEASE ORAL at 17:17

## 2023-09-11 RX ADMIN — REMDESIVIR 200 MILLIGRAM(S): 5 INJECTION INTRAVENOUS at 03:51

## 2023-09-11 RX ADMIN — Medication 81 MILLIGRAM(S): at 11:43

## 2023-09-11 RX ADMIN — CARVEDILOL PHOSPHATE 25 MILLIGRAM(S): 80 CAPSULE, EXTENDED RELEASE ORAL at 06:02

## 2023-09-11 NOTE — PROGRESS NOTE ADULT - ASSESSMENT
69 y/o man w PMHx of HTN, DM2, CAD s/p stents x8, unclear when most recent stent was, COPD w 90 pack year hx, stopped smoking ~2003, developed few days of SOB and dry cough, went to [facility] where he had COVID(+) test and hypoxia, referred to ED where he was placed on supplemental O2 for 95% on RA at rest and 90% ambulatory, labs notable for Cr 1.2, WBC 7.24, admitted to medicine for management of COVID.   HCP wife Joan John [phone]     #COVID(+)   #COPD   - now off oxygen   - ID consult appreciated   - RDV x3 days started  - dexa 6mg IV while in hospital   - Ambulatory O2 sat  - Lovenox 40mg BID as per COVID treatment protocols for non ICU/ CrCl>15/ BMI>30    #HTN - cont meds from d/c of 12/2022  #CAD s/p stents x8   - cont meds from d/c of 12/2022  - echo 12/2022 w EF 61% and AV stenosis    #DM2  Increase lantus to 30u  SS insulin for now                                                                               ----------------------------------------------------  # DVT prophylaxis: Lovenox 40mg BID for COVID   # GI prophylaxis: Pantoprazole 40mg QD   # Diet: DASH TLC 1200cc +glucerna   # Activity: Ambulate as Tolerated   # Code status: FULL CODE   # Disposition: From home                                                                            --------------------------------------------------------  Pending: sugar control, 1 more day remdesivir   [facility]

## 2023-09-11 NOTE — PHYSICAL THERAPY INITIAL EVALUATION ADULT - PLANNED THERAPY INTERVENTIONS, PT EVAL
No PT intervention needed. Patient independent in bed mobility, transfers and ambulation no AD. Contact PT if status changes.

## 2023-09-11 NOTE — DISCHARGE NOTE PROVIDER - NSDCMRMEDTOKEN_GEN_ALL_CORE_FT
amLODIPine 10 mg oral tablet: 1 tab(s) orally once a day  Aspir 81 oral delayed release tablet: 1 tab(s) orally once a day  carvedilol 25 mg oral tablet: 1 tab(s) orally 2 times a day  losartan-hydroCHLOROthiazide 100 mg-25 mg oral tablet: 1 tab(s) orally once a day  metFORMIN 500 mg oral tablet: 2 tab(s) orally 2 times a day  pantoprazole 40 mg oral delayed release tablet: 1 tab(s) orally once a day  prasugrel 5 mg oral tablet: 1 tab(s) orally once a day  rosuvastatin 20 mg oral tablet: 1 tab(s) orally once a day (at bedtime)   amLODIPine 10 mg oral tablet: 1 tab(s) orally once a day  aspirin 81 mg oral tablet: 1 tab(s) orally once a day   Albuterol (Eqv-Ventolin HFA) 90 mcg/inh inhalation aerosol: 2 puff(s) inhaled every 6 hours as needed for  shortness of breath and/or wheezing  amLODIPine 10 mg oral tablet: 1 tab(s) orally once a day (at bedtime)  aspirin 81 mg oral tablet: 1 tab(s) orally once a day  benzonatate 100 mg oral capsule: 1 cap(s) orally 3 times a day as needed for  severe cough  carvedilol 25 mg oral tablet: 1 tab(s) orally 2 times a day  guaiFENesin 600 mg oral tablet, extended release: 1 tab(s) orally every 12 hours as needed for  cough  Lantus Solostar Pen 100 units/mL subcutaneous solution: 12 unit(s) subcutaneous once a day (at bedtime) Inject 12U each night until your morning blood sugar the next day is less than 180. Once it is less than 180 you can stop the injections.  losartan-hydroCHLOROthiazide 100 mg-25 mg oral tablet: 1 tab(s) orally once a day  metFORMIN 500 mg oral tablet: 2 tab(s) orally 2 times a day  pantoprazole 40 mg oral delayed release tablet: 1 tab(s) orally once a day  prasugrel 5 mg oral tablet: 1 tab(s) orally once a day  rosuvastatin 20 mg oral tablet: 1 tab(s) orally once a day (at bedtime)

## 2023-09-11 NOTE — PHYSICAL THERAPY INITIAL EVALUATION ADULT - PERTINENT HX OF CURRENT PROBLEM, REHAB EVAL
67 y/o man w PMHx of HTN, DM2, CAD s/p stents x8, unclear when most recent stent was, COPD w 90 pack year hx, stopped smoking ~2003, developed few days of SOB and dry cough, went to [facility] where he had COVID(+) test and hypoxia, referred to ED where he was placed on supplemental O2 for 95% on RA at rest and 90% ambulatory, labs notable for Cr 1.2, WBC 7.24, admitted to medicine for management of COVID.

## 2023-09-11 NOTE — DISCHARGE NOTE PROVIDER - NSDCCPCAREPLAN_GEN_ALL_CORE_FT
PRINCIPAL DISCHARGE DIAGNOSIS  Diagnosis: COVID-19  Assessment and Plan of Treatment: You were admitted to the hospital for a COVID-19 infection. You were given a 3-day course of remdesivr and steroids with improvement in your symptoms. Please take all medications exactly as prescribed. Follow up with your primary care physician as needed.      SECONDARY DISCHARGE DIAGNOSES  Diagnosis: Hypoxia  Assessment and Plan of Treatment:      PRINCIPAL DISCHARGE DIAGNOSIS  Diagnosis: COVID-19  Assessment and Plan of Treatment: You were admitted to the hospital for a COVID-19 infection. You were given a 3-day course of remdesivir and steroids with improvement in your symptoms. Please take all medications exactly as prescribed. Follow up with your primary care physician as needed.  You are being sent on a few days course of injectable insulin for elevated blood sugars secondary to the use of steroids in your treatment. You will need to inject 12U each night and check your blood sugar each morning. Once your blood sugar is less than 180 in the morning, you can stop doing the insulin injections.      SECONDARY DISCHARGE DIAGNOSES  Diagnosis: Hypoxia  Assessment and Plan of Treatment:

## 2023-09-11 NOTE — DISCHARGE NOTE PROVIDER - NSDCFUADDAPPT_GEN_ALL_CORE_FT
Consent: The patient's consent was obtained including but not limited to risks of crusting, scabbing, blistering, scarring, darker or lighter pigmentary change, recurrence, incomplete removal and infection. Duration Of Freeze Thaw-Cycle (Seconds): 5 Render Post-Care Instructions In Note?: no Post-Care Instructions: I reviewed with the patient in detail post-care instructions. Patient is to wear sunprotection, and avoid picking at any of the treated lesions. Pt may apply Vaseline to crusted or scabbing areas. Number Of Freeze-Thaw Cycles: 1 freeze-thaw cycle You are going to [facility] this Friday for a check up  Detail Level: Zone

## 2023-09-11 NOTE — DISCHARGE NOTE PROVIDER - ATTENDING DISCHARGE PHYSICAL EXAMINATION:
Vital Signs Last 24 Hrs  T(F): 95.9 (12 Sep 2023 08:18), Max: 98.9 (12 Sep 2023 00:02)  HR: 56 (12 Sep 2023 08:18) (51 - 92)  BP: 139/65 (12 Sep 2023 08:18) (104/56 - 159/69)  RR: 18 (12 Sep 2023 08:18) (18 - 18)  SpO2: --    PHYSICAL EXAM:  GENERAL: NAD, well-groomed, well-developed  HEAD:  Atraumatic, Normocephalic  EYES: EOMI, conjunctiva and sclera clear  ENMT: Moist mucous membranes, Good dentition, no thrush  NECK: Supple, No JVD  CHEST/LUNG: some expiratory wheezing, good air entry, non-labored breathing  HEART: RRR; S1/S2, No murmur  ABDOMEN: Soft, Nontender, Nondistended; obese   VASCULAR: Normal pulses, Normal capillary refill  EXTREMITIES: No calf tenderness, No cyanosis, No edema  LYMPH: Normal; No lymphadenopathy noted  SKIN: Warm, Intact  PSYCH: Normal mood, Normal affect  NERVOUS SYSTEM:  A/O x3, Good concentration; CN 2-12 intact, No focal deficits

## 2023-09-11 NOTE — PROGRESS NOTE ADULT - SUBJECTIVE AND OBJECTIVE BOX
Patient is a 68y old  Male who presents with a chief complaint of COVID(+), hypoxemia (11 Sep 2023 11:55)      Patient seen and examined at bedside.    ALLERGIES:  No Known Allergies    MEDICATIONS:  acetaminophen     Tablet .. 650 milliGRAM(s) Oral every 6 hours PRN  albuterol    90 MICROgram(s) HFA Inhaler 2 Puff(s) Inhalation every 6 hours PRN  albuterol/ipratropium for Nebulization 3 milliLiter(s) Nebulizer every 6 hours  amLODIPine   Tablet 10 milliGRAM(s) Oral at bedtime  aspirin  chewable 81 milliGRAM(s) Oral daily  atorvastatin 40 milliGRAM(s) Oral at bedtime  carvedilol 25 milliGRAM(s) Oral every 12 hours  dexAMETHasone  Injectable 6 milliGRAM(s) IV Push daily  dextrose 5%. 1000 milliLiter(s) IV Continuous <Continuous>  dextrose 5%. 1000 milliLiter(s) IV Continuous <Continuous>  dextrose 50% Injectable 25 Gram(s) IV Push once  dextrose 50% Injectable 12.5 Gram(s) IV Push once  dextrose 50% Injectable 25 Gram(s) IV Push once  dextrose Oral Gel 15 Gram(s) Oral once PRN  enoxaparin Injectable 40 milliGRAM(s) SubCutaneous every 24 hours  glucagon  Injectable 1 milliGRAM(s) IntraMuscular once  guaiFENesin  milliGRAM(s) Oral every 12 hours  hydrochlorothiazide 25 milliGRAM(s) Oral daily  influenza  Vaccine (HIGH DOSE) 0.7 milliLiter(s) IntraMuscular once  insulin glargine Injectable (LANTUS) 15 Unit(s) SubCutaneous at bedtime  insulin lispro (ADMELOG) corrective regimen sliding scale   SubCutaneous three times a day before meals  insulin lispro (ADMELOG) corrective regimen sliding scale   SubCutaneous at bedtime  losartan 100 milliGRAM(s) Oral daily  melatonin 3 milliGRAM(s) Oral at bedtime PRN  pantoprazole    Tablet 40 milliGRAM(s) Oral before breakfast  polyethylene glycol 3350 17 Gram(s) Oral daily  prasugrel 5 milliGRAM(s) Oral daily  senna 2 Tablet(s) Oral at bedtime    Vital Signs Last 24 Hrs  T(F): 95.9 (12 Sep 2023 08:18), Max: 98.9 (12 Sep 2023 00:02)  HR: 56 (12 Sep 2023 08:18) (51 - 92)  BP: 139/65 (12 Sep 2023 08:18) (104/56 - 159/69)  RR: 18 (12 Sep 2023 08:18) (18 - 18)  SpO2: --  I&O's Summary      PHYSICAL EXAM:  General: NAD, A/O x 3  ENT: MMM  Neck: Supple, No JVD  Lungs: expiratory wheeze   Cardio: RRR, S1/S2, No murmurs  Abdomen: Soft, Nontender, Nondistended; Bowel sounds present  Extremities: No cyanosis, No edema    LABS:                        12.8   7.24  )-----------( 162      ( 09 Sep 2023 21:08 )             38.7     09-12    x   |  x   |  x   ----------------------------<  x   x    |  x   |  0.9    Ca    9.2      09 Sep 2023 21:08    TPro  6.8  /  Alb  4.2  /  TBili  0.2  /  DBili  <0.2  /  AST  13  /  ALT  15  /  AlkPhos  71  09-12      PT/INR - ( 12 Sep 2023 07:58 )   PT: 11.30 sec;   INR: 0.99 ratio                 09-10 Chol -- LDL -- HDL -- Trig 139 mg/dL              POCT Blood Glucose.: 309 mg/dL (12 Sep 2023 11:24)  POCT Blood Glucose.: 184 mg/dL (12 Sep 2023 07:55)  POCT Blood Glucose.: 301 mg/dL (11 Sep 2023 21:53)  POCT Blood Glucose.: 297 mg/dL (11 Sep 2023 16:15)      Urinalysis Basic - ( 09 Sep 2023 21:08 )    Color: x / Appearance: x / SG: x / pH: x  Gluc: 130 mg/dL / Ketone: x  / Bili: x / Urobili: x   Blood: x / Protein: x / Nitrite: x   Leuk Esterase: x / RBC: x / WBC x   Sq Epi: x / Non Sq Epi: x / Bacteria: x            RADIOLOGY & ADDITIONAL TESTS:    Care Discussed with Consultants/Other Providers:  Patient is a 68y old  Male who presents with a chief complaint of COVID(+), hypoxemia (11 Sep 2023 11:55)      Patient seen and examined at bedside.  Patient denies any chest pain or shortness of breath   ALLERGIES:  No Known Allergies    MEDICATIONS:  acetaminophen     Tablet .. 650 milliGRAM(s) Oral every 6 hours PRN  albuterol    90 MICROgram(s) HFA Inhaler 2 Puff(s) Inhalation every 6 hours PRN  albuterol/ipratropium for Nebulization 3 milliLiter(s) Nebulizer every 6 hours  amLODIPine   Tablet 10 milliGRAM(s) Oral at bedtime  aspirin  chewable 81 milliGRAM(s) Oral daily  atorvastatin 40 milliGRAM(s) Oral at bedtime  carvedilol 25 milliGRAM(s) Oral every 12 hours  dexAMETHasone  Injectable 6 milliGRAM(s) IV Push daily  dextrose 5%. 1000 milliLiter(s) IV Continuous <Continuous>  dextrose 5%. 1000 milliLiter(s) IV Continuous <Continuous>  dextrose 50% Injectable 25 Gram(s) IV Push once  dextrose 50% Injectable 12.5 Gram(s) IV Push once  dextrose 50% Injectable 25 Gram(s) IV Push once  dextrose Oral Gel 15 Gram(s) Oral once PRN  enoxaparin Injectable 40 milliGRAM(s) SubCutaneous every 24 hours  glucagon  Injectable 1 milliGRAM(s) IntraMuscular once  guaiFENesin  milliGRAM(s) Oral every 12 hours  hydrochlorothiazide 25 milliGRAM(s) Oral daily  influenza  Vaccine (HIGH DOSE) 0.7 milliLiter(s) IntraMuscular once  insulin glargine Injectable (LANTUS) 15 Unit(s) SubCutaneous at bedtime  insulin lispro (ADMELOG) corrective regimen sliding scale   SubCutaneous three times a day before meals  insulin lispro (ADMELOG) corrective regimen sliding scale   SubCutaneous at bedtime  losartan 100 milliGRAM(s) Oral daily  melatonin 3 milliGRAM(s) Oral at bedtime PRN  pantoprazole    Tablet 40 milliGRAM(s) Oral before breakfast  polyethylene glycol 3350 17 Gram(s) Oral daily  prasugrel 5 milliGRAM(s) Oral daily  senna 2 Tablet(s) Oral at bedtime    Vital Signs Last 24 Hrs  T(F): 95.9 (12 Sep 2023 08:18), Max: 98.9 (12 Sep 2023 00:02)  HR: 56 (12 Sep 2023 08:18) (51 - 92)  BP: 139/65 (12 Sep 2023 08:18) (104/56 - 159/69)  RR: 18 (12 Sep 2023 08:18) (18 - 18)  SpO2: --  I&O's Summary      PHYSICAL EXAM:  General: NAD, A/O x 3  ENT: MMM  Neck: Supple, No JVD  Lungs: expiratory wheeze   Cardio: RRR, S1/S2, No murmurs  Abdomen: Soft, Nontender, Nondistended; obese  Extremities: No cyanosis, No edema    LABS:                        12.8   7.24  )-----------( 162      ( 09 Sep 2023 21:08 )             38.7     09-12    x   |  x   |  x   ----------------------------<  x   x    |  x   |  0.9    Ca    9.2      09 Sep 2023 21:08    TPro  6.8  /  Alb  4.2  /  TBili  0.2  /  DBili  <0.2  /  AST  13  /  ALT  15  /  AlkPhos  71  09-12      PT/INR - ( 12 Sep 2023 07:58 )   PT: 11.30 sec;   INR: 0.99 ratio                 09-10 Chol -- LDL -- HDL -- Trig 139 mg/dL              POCT Blood Glucose.: 309 mg/dL (12 Sep 2023 11:24)  POCT Blood Glucose.: 184 mg/dL (12 Sep 2023 07:55)  POCT Blood Glucose.: 301 mg/dL (11 Sep 2023 21:53)  POCT Blood Glucose.: 297 mg/dL (11 Sep 2023 16:15)      Urinalysis Basic - ( 09 Sep 2023 21:08 )    Color: x / Appearance: x / SG: x / pH: x  Gluc: 130 mg/dL / Ketone: x  / Bili: x / Urobili: x   Blood: x / Protein: x / Nitrite: x   Leuk Esterase: x / RBC: x / WBC x   Sq Epi: x / Non Sq Epi: x / Bacteria: x            RADIOLOGY & ADDITIONAL TESTS:    Care Discussed with Consultants/Other Providers:

## 2023-09-11 NOTE — DISCHARGE NOTE PROVIDER - HOSPITAL COURSE
HPI:  67 y/o man w PMHx of HTN, DM2, CAD s/p stents x8, unclear when most recent stent was, COPD w 90 pack year hx, stopped smoking ~2003, developed few days of SOB and dry cough, went to [facility] where he had COVID(+) test and hypoxia. States he has developed dry cough x few days, no known sick contacts but was at a gathering a few days ago. (+)myalgias and SOB as well, though now both feeling improved at time of admission. Does not require supplemental O2 at baseline. Does not know his medications, states his wife will bring the list tomorrow and asked me to not call her at this late hour. On ROS also endorsed some abd distension. No CP, n/v/d. Is retired now but previously worked in construction.     ED course:   Triage vitals were: 136/63, 72bpm, RR 20, 94% on RA, 99.1F   Labs notable for: CRP 36.2, , Cr 1.2, WBC 7.24    Imaging:   CXR 9/9: The cardiac silhouette is normal in size. Bibasilar opacities. No evidence of pneumothorax.    Hospital course:   #COVID(+)   #COPD   - (+)SOB and currently on supplemental O2 but has h/o COPD so O2 goal is 88-92%  -dimer <150, will not complete VA duplex   - ID consulted for additional recs -> recommended 5-day course RDV and dexamethasone 6 mg iv q24h for 10 days (only if inpatient that long, no steroids on DC)  - s/p 3 days RDV w/ improvement in respiratory status   - Ambulatory O2 sat    #HTN - cont meds from d/c of 12/2022  #CAD s/p stents x8   - cont meds from d/c of 12/2022  - echo 12/2022 w EF 61% and AV stenosis    #DM2  States only takes oral antidiabetics, no insulin          HPI:  67 y/o man w PMHx of HTN, DM2, CAD s/p stents x8, unclear when most recent stent was, COPD w 90 pack year hx, stopped smoking ~2003, developed few days of SOB and dry cough, went to [facility] where he had COVID(+) test and hypoxia. States he has developed dry cough x few days, no known sick contacts but was at a gathering a few days ago. (+)myalgias and SOB as well, though now both feeling improved at time of admission. Does not require supplemental O2 at baseline. Does not know his medications, states his wife will bring the list tomorrow and asked me to not call her at this late hour. On ROS also endorsed some abd distension. No CP, n/v/d. Is retired now but previously worked in construction.     ED course:   Triage vitals were: 136/63, 72bpm, RR 20, 94% on RA, 99.1F   Labs notable for: CRP 36.2, , Cr 1.2, WBC 7.24    Imaging:   CXR (9/9/2023): The cardiac silhouette is normal in size. Bibasilar opacities. No evidence of pneumothorax.    Hospital course:   #COVID(+)   #COPD   - (+)SOB and placed on supplemental O2 when for 95% on RA at rest and 90% ambulatory  - pt only required supplemental O2 first day of admission, has been on RA for the remainder of his stay   - dimer <150  - ID consulted for additional recs -> recommended 5-day course RDV and dexamethasone 6 mg iv q24h for 10 days (only if inpatient that long, no steroids on DC)  - s/p 3 days RDV and dexamethasone 6mg IV daily w/ improvement in respiratory status     #HTN - cont meds from d/c of 12/2022  #CAD s/p stents x8   - echo 12/2022 w EF 61% and AV stenosis  - carvedilol 25mg BID, losartan-HCTZ 100/25mg daily, amlodipine 10mg daily, rosuvastatin 20mg daily, aspirin 81mg daily, prasugrel 5mg daily     #DM2  - on metformin 1000mg BID at home   - sugars elevated during admission 2/2 dexamethasone   - will discharge on a few days of lantus to keep sugars controlled s/p steroids   - per wife, they have glucometer at home and she gives herself injections since she is diabetic as well so she can help with his injections  - discharging on 12U lantus, to continue until morning sugar is less than 180       At time of discharge, pt is HDS, saturating in the 90s on RA. Discharging on a few days of 12U lantus to control sugars following inpatient dexamethasone. HPI:  67 y/o man w PMHx of HTN, DM2, CAD s/p stents x8, unclear when most recent stent was, COPD w 90 pack year hx, stopped smoking ~2003, developed few days of SOB and dry cough, went to [facility] where he had COVID(+) test and hypoxia. States he has developed dry cough x few days, no known sick contacts but was at a gathering a few days ago. (+)myalgias and SOB as well, though now both feeling improved at time of admission. Does not require supplemental O2 at baseline. Does not know his medications, states his wife will bring the list tomorrow and asked me to not call her at this late hour. On ROS also endorsed some abd distension. No CP, n/v/d. Is retired now but previously worked in construction.     ED course:   Triage vitals were: 136/63, 72bpm, RR 20, 94% on RA, 99.1F   Labs notable for: CRP 36.2, , Cr 1.2, WBC 7.24    Imaging:   CXR (9/9/2023): The cardiac silhouette is normal in size. Bibasilar opacities. No evidence of pneumothorax.    Hospital course:   #COVID(+)   #COPD   - (+)SOB and placed on supplemental O2 when for 95% on RA at rest and 90% ambulatory  - pt only required supplemental O2 first day of admission, has been on RA for the remainder of his stay   - dimer <150  - ID consulted for additional recs -> recommended 5-day course RDV and dexamethasone 6 mg iv q24h for 10 days (only if inpatient that long, no steroids on DC)  - s/p 3 days RDV and dexamethasone 6mg IV daily w/ improvement in respiratory status     #HTN - cont meds from d/c of 12/2022  #CAD s/p stents x8   - echo 12/2022 w EF 61% and AV stenosis  - carvedilol 25mg BID, losartan-HCTZ 100/25mg daily, amlodipine 10mg daily, rosuvastatin 20mg daily, aspirin 81mg daily, prasugrel 5mg daily     #DM2  - on metformin 1000mg BID at home   - sugars elevated during admission 2/2 dexamethasone   - will discharge on a few days of lantus to keep sugars controlled s/p steroids   - per wife, they have glucometer at home and she gives herself injections since she is diabetic as well so she can help with his injections  - discharging on 12U lantus, to continue until morning sugar is less than 180       At time of discharge, pt is HDS, saturating in the 90s on RA. Discharging on a few days of 12U lantus to control sugars following inpatient dexamethasone. Will also send with a 10-day course of Mucinex for cough and 10-day course of Tesslon pearls for severe cough. HPI:  67 y/o man w PMHx of HTN, DM2, CAD s/p stents x8, unclear when most recent stent was, COPD w 90 pack year hx, stopped smoking ~2003, developed few days of SOB and dry cough, went to [facility] where he had COVID(+) test and hypoxia. States he has developed dry cough x few days, no known sick contacts but was at a gathering a few days ago. (+)myalgias and SOB as well, though now both feeling improved at time of admission. Does not require supplemental O2 at baseline. Does not know his medications, states his wife will bring the list tomorrow and asked me to not call her at this late hour. On ROS also endorsed some abd distension. No CP, n/v/d. Is retired now but previously worked in construction.     ED course:   Triage vitals were: 136/63, 72bpm, RR 20, 94% on RA, 99.1F   Labs notable for: CRP 36.2, , Cr 1.2, WBC 7.24    Imaging:   CXR (9/9/2023): The cardiac silhouette is normal in size. Bibasilar opacities. No evidence of pneumothorax.    Hospital course:   #COVID(+)   #COPD   - (+)SOB and placed on supplemental O2 when for 95% on RA at rest and 90% ambulatory  - pt only required supplemental O2 first day of admission, has been on RA for the remainder of his stay   - dimer <150  - ID consulted for additional recs -> recommended 5-day course RDV and dexamethasone 6 mg iv q24h for 10 days (only if inpatient that long, no steroids on DC)  - s/p 3 days RDV and dexamethasone 6mg IV daily w/ improvement in respiratory status     #HTN - cont meds from d/c of 12/2022  #CAD s/p stents x8   - echo 12/2022 w EF 61% and AV stenosis  - carvedilol 25mg BID, losartan-HCTZ 100/25mg daily, amlodipine 10mg daily, rosuvastatin 20mg daily, aspirin 81mg daily, prasugrel 5mg daily     #DM2  - on metformin 1000mg BID at home   - sugars elevated during admission 2/2 dexamethasone   - will discharge on a few days of lantus to keep sugars controlled s/p steroids   - per wife, they have glucometer at home and she gives herself injections since she is diabetic as well so she can help with his injections  - discharging on 12U lantus, to continue until morning sugar is less than 180       At time of discharge, pt is HDS, saturating in the 90s on RA. Discharging on a few days of 12U lantus to control sugars following inpatient dexamethasone. Will also send with a 10-day course of Mucinex for cough and 10-day course of Tesslon pearls for severe cough.     Patient plans to see doctor on 9/15 to review his sugars.  [facility].

## 2023-09-11 NOTE — DISCHARGE NOTE PROVIDER - NSDCFUSCHEDAPPT_GEN_ALL_CORE_FT
Jamison Mcmillan  [facility]  [facility] [address] [place] [address] Av  Scheduled Appointment: 11/15/2023

## 2023-09-12 ENCOUNTER — TRANSCRIPTION ENCOUNTER (OUTPATIENT)
Age: 68
End: 2023-09-12

## 2023-09-12 VITALS
TEMPERATURE: 97 F | DIASTOLIC BLOOD PRESSURE: 65 MMHG | RESPIRATION RATE: 18 BRPM | SYSTOLIC BLOOD PRESSURE: 139 MMHG | HEART RATE: 56 BPM

## 2023-09-12 LAB
A1C WITH ESTIMATED AVERAGE GLUCOSE RESULT: 7.4 % — HIGH (ref 4–5.6)
ALBUMIN SERPL ELPH-MCNC: 4.2 G/DL — SIGNIFICANT CHANGE UP (ref 3.5–5.2)
ALP SERPL-CCNC: 71 U/L — SIGNIFICANT CHANGE UP (ref 30–115)
ALT FLD-CCNC: 15 U/L — SIGNIFICANT CHANGE UP (ref 0–41)
AST SERPL-CCNC: 13 U/L — SIGNIFICANT CHANGE UP (ref 0–41)
BILIRUB DIRECT SERPL-MCNC: <0.2 MG/DL — SIGNIFICANT CHANGE UP (ref 0–0.3)
BILIRUB INDIRECT FLD-MCNC: >0 MG/DL — LOW (ref 0.2–1.2)
BILIRUB SERPL-MCNC: 0.2 MG/DL — SIGNIFICANT CHANGE UP (ref 0.2–1.2)
CREAT SERPL-MCNC: 0.9 MG/DL — SIGNIFICANT CHANGE UP (ref 0.7–1.5)
CRP SERPL-MCNC: 17.3 MG/L — HIGH
EGFR: 92 ML/MIN/1.73M2 — SIGNIFICANT CHANGE UP
ESTIMATED AVERAGE GLUCOSE: 166 MG/DL — HIGH (ref 68–114)
GLUCOSE BLDC GLUCOMTR-MCNC: 184 MG/DL — HIGH (ref 70–99)
GLUCOSE BLDC GLUCOMTR-MCNC: 309 MG/DL — HIGH (ref 70–99)
INR BLD: 0.99 RATIO — SIGNIFICANT CHANGE UP (ref 0.65–1.3)
LDH SERPL L TO P-CCNC: 180 U/L — SIGNIFICANT CHANGE UP (ref 50–242)
PROT SERPL-MCNC: 6.8 G/DL — SIGNIFICANT CHANGE UP (ref 6–8)
PROTHROM AB SERPL-ACNC: 11.3 SEC — SIGNIFICANT CHANGE UP (ref 9.95–12.87)

## 2023-09-12 PROCEDURE — 99239 HOSP IP/OBS DSCHRG MGMT >30: CPT

## 2023-09-12 RX ORDER — CARVEDILOL PHOSPHATE 80 MG/1
1 CAPSULE, EXTENDED RELEASE ORAL
Qty: 0 | Refills: 0 | DISCHARGE

## 2023-09-12 RX ORDER — LOSARTAN/HYDROCHLOROTHIAZIDE 100MG-25MG
1 TABLET ORAL
Qty: 30 | Refills: 1
Start: 2023-09-12 | End: 2023-11-10

## 2023-09-12 RX ORDER — METFORMIN HYDROCHLORIDE 850 MG/1
2 TABLET ORAL
Qty: 120 | Refills: 1
Start: 2023-09-12 | End: 2023-11-10

## 2023-09-12 RX ORDER — ALBUTEROL 90 UG/1
2 AEROSOL, METERED ORAL
Qty: 1 | Refills: 1
Start: 2023-09-12

## 2023-09-12 RX ORDER — ROSUVASTATIN CALCIUM 5 MG/1
1 TABLET ORAL
Qty: 30 | Refills: 1
Start: 2023-09-12 | End: 2023-11-10

## 2023-09-12 RX ORDER — CARVEDILOL PHOSPHATE 80 MG/1
1 CAPSULE, EXTENDED RELEASE ORAL
Qty: 60 | Refills: 1
Start: 2023-09-12 | End: 2023-11-10

## 2023-09-12 RX ORDER — METFORMIN HYDROCHLORIDE 850 MG/1
2 TABLET ORAL
Qty: 0 | Refills: 0 | DISCHARGE

## 2023-09-12 RX ORDER — PANTOPRAZOLE SODIUM 20 MG/1
1 TABLET, DELAYED RELEASE ORAL
Qty: 0 | Refills: 0 | DISCHARGE

## 2023-09-12 RX ORDER — ROSUVASTATIN CALCIUM 5 MG/1
1 TABLET ORAL
Qty: 0 | Refills: 0 | DISCHARGE

## 2023-09-12 RX ORDER — ASPIRIN/CALCIUM CARB/MAGNESIUM 324 MG
1 TABLET ORAL
Qty: 0 | Refills: 0 | DISCHARGE

## 2023-09-12 RX ORDER — ASPIRIN/CALCIUM CARB/MAGNESIUM 324 MG
1 TABLET ORAL
Qty: 30 | Refills: 1
Start: 2023-09-12 | End: 2023-11-10

## 2023-09-12 RX ORDER — AMLODIPINE BESYLATE 2.5 MG/1
1 TABLET ORAL
Qty: 30 | Refills: 1
Start: 2023-09-12 | End: 2023-11-10

## 2023-09-12 RX ORDER — INSULIN GLARGINE 100 [IU]/ML
12 INJECTION, SOLUTION SUBCUTANEOUS
Qty: 1 | Refills: 0
Start: 2023-09-12 | End: 2023-09-14

## 2023-09-12 RX ORDER — PANTOPRAZOLE SODIUM 20 MG/1
1 TABLET, DELAYED RELEASE ORAL
Qty: 30 | Refills: 0
Start: 2023-09-12 | End: 2023-10-11

## 2023-09-12 RX ORDER — AMLODIPINE BESYLATE 2.5 MG/1
1 TABLET ORAL
Qty: 0 | Refills: 0 | DISCHARGE

## 2023-09-12 RX ORDER — PRASUGREL 5 MG/1
1 TABLET, FILM COATED ORAL
Qty: 0 | Refills: 0 | DISCHARGE

## 2023-09-12 RX ORDER — LOSARTAN/HYDROCHLOROTHIAZIDE 100MG-25MG
1 TABLET ORAL
Qty: 0 | Refills: 0 | DISCHARGE

## 2023-09-12 RX ORDER — PRASUGREL 5 MG/1
1 TABLET, FILM COATED ORAL
Qty: 30 | Refills: 1
Start: 2023-09-12 | End: 2023-11-10

## 2023-09-12 RX ADMIN — PANTOPRAZOLE SODIUM 40 MILLIGRAM(S): 20 TABLET, DELAYED RELEASE ORAL at 06:30

## 2023-09-12 RX ADMIN — CARVEDILOL PHOSPHATE 25 MILLIGRAM(S): 80 CAPSULE, EXTENDED RELEASE ORAL at 06:24

## 2023-09-12 RX ADMIN — REMDESIVIR 200 MILLIGRAM(S): 5 INJECTION INTRAVENOUS at 03:10

## 2023-09-12 RX ADMIN — Medication 2: at 08:10

## 2023-09-12 RX ADMIN — Medication 600 MILLIGRAM(S): at 06:24

## 2023-09-12 RX ADMIN — Medication 6 MILLIGRAM(S): at 06:24

## 2023-09-12 RX ADMIN — POLYETHYLENE GLYCOL 3350 17 GRAM(S): 17 POWDER, FOR SOLUTION ORAL at 11:52

## 2023-09-12 RX ADMIN — PRASUGREL 5 MILLIGRAM(S): 5 TABLET, FILM COATED ORAL at 11:52

## 2023-09-12 RX ADMIN — ENOXAPARIN SODIUM 40 MILLIGRAM(S): 100 INJECTION SUBCUTANEOUS at 06:28

## 2023-09-12 RX ADMIN — Medication 81 MILLIGRAM(S): at 11:52

## 2023-09-12 RX ADMIN — LOSARTAN POTASSIUM 100 MILLIGRAM(S): 100 TABLET, FILM COATED ORAL at 06:30

## 2023-09-12 RX ADMIN — Medication 8: at 11:52

## 2023-09-12 NOTE — CHART NOTE - NSCHARTNOTEFT_GEN_A_CORE
<<<RESIDENT DISCHARGE NOTE>>>     BRAD SAM  MRN-[id number]    VITAL SIGNS:  T(F): 95.9 (09-12-23 @ 08:18), Max: 98.9 (09-12-23 @ 00:02)  HR: 56 (09-12-23 @ 08:18)  BP: 139/65 (09-12-23 @ 08:18)  SpO2: --      PHYSICAL EXAMINATION:    General: NAD, sitting in chair by bed  Neck: Supple, no JVD  Lungs: Unlabored respirations, b/l air entry, mild expiratory wheeze, on RA   Cardio: Regular rate and rhythm, normal s1s2  Abdomen: Soft, nontender, nondistended; +BS  Extremities: No clubbing, cyanosis, or edema     TEST RESULTS:      09-12    x   |  x   |  x   ----------------------------<  x   x    |  x   |  0.9      TPro  6.8  /  Alb  4.2  /  TBili  0.2  /  DBili  <0.2  /  AST  13  /  ALT  15  /  AlkPhos  71  09-12      FINAL DISCHARGE INTERVIEW:  Resident(s) Present: (Name: Milly Manzano)    DISCHARGE MEDICATION RECONCILIATION  reviewed with Attending (Name:_Denise Bernabe_)    DISPOSITION:   [ x ] Home,    [  ] Home with Visiting Nursing Services,   [  ]  SNF/ NH,    [   ] Acute Rehab (4A),   [   ] Other (Specify:_________)

## 2023-09-12 NOTE — DISCHARGE NOTE NURSING/CASE MANAGEMENT/SOCIAL WORK - PATIENT PORTAL LINK FT
You can access the FollowMyHealth Patient Portal offered by Health system by registering at the following website: [email or web address]. By joining [facility]’s FollowMyHealth portal, you will also be able to view your health information using other applications (apps) compatible with our system.

## 2023-09-13 LAB
A-TUMOR NECROSIS FACT SERPL-MCNC: 2.4 PG/ML — SIGNIFICANT CHANGE UP
FERRITIN SERPL-MCNC: 128 NG/ML — SIGNIFICANT CHANGE UP (ref 30–400)
IL10 SERPL-MCNC: 14.9 PG/ML — HIGH
IL12 SERPL-MCNC: <1.9 PG/ML — SIGNIFICANT CHANGE UP
IL13 SERPL-MCNC: <1.7 PG/ML — SIGNIFICANT CHANGE UP
IL17A SERPL-MCNC: <1.4 PG/ML — SIGNIFICANT CHANGE UP
IL2 SERPL-MCNC: 1612.4 PG/ML — HIGH (ref 175.3–858.2)
IL2 SERPL-MCNC: <2.1 PG/ML — SIGNIFICANT CHANGE UP
IL4 SERPL-MCNC: <2.2 PG/ML — SIGNIFICANT CHANGE UP
IL6 SERPL-MCNC: 2.9 PG/ML — HIGH
IL8 SERPL-MCNC: <3 PG/ML — SIGNIFICANT CHANGE UP
INTERFERON GAMMA, BLOOD: <4.2 PG/ML — SIGNIFICANT CHANGE UP
INTERLEUKIN 1 BETA: <6.5 PG/ML — SIGNIFICANT CHANGE UP
INTERLEUKIN 5: <2.1 PG/ML — SIGNIFICANT CHANGE UP

## 2023-09-19 DIAGNOSIS — U07.1 COVID-19: ICD-10-CM

## 2023-09-19 DIAGNOSIS — Z79.82 LONG TERM (CURRENT) USE OF ASPIRIN: ICD-10-CM

## 2023-09-19 DIAGNOSIS — Z95.5 PRESENCE OF CORONARY ANGIOPLASTY IMPLANT AND GRAFT: ICD-10-CM

## 2023-09-19 DIAGNOSIS — I25.10 ATHEROSCLEROTIC HEART DISEASE OF NATIVE CORONARY ARTERY WITHOUT ANGINA PECTORIS: ICD-10-CM

## 2023-09-19 DIAGNOSIS — Z79.02 LONG TERM (CURRENT) USE OF ANTITHROMBOTICS/ANTIPLATELETS: ICD-10-CM

## 2023-09-19 DIAGNOSIS — Z87.891 PERSONAL HISTORY OF NICOTINE DEPENDENCE: ICD-10-CM

## 2023-09-19 DIAGNOSIS — Z79.84 LONG TERM (CURRENT) USE OF ORAL HYPOGLYCEMIC DRUGS: ICD-10-CM

## 2023-09-19 DIAGNOSIS — E11.65 TYPE 2 DIABETES MELLITUS WITH HYPERGLYCEMIA: ICD-10-CM

## 2023-09-19 DIAGNOSIS — J44.1 CHRONIC OBSTRUCTIVE PULMONARY DISEASE WITH (ACUTE) EXACERBATION: ICD-10-CM

## 2023-09-19 DIAGNOSIS — T38.0X5A ADVERSE EFFECT OF GLUCOCORTICOIDS AND SYNTHETIC ANALOGUES, INITIAL ENCOUNTER: ICD-10-CM

## 2023-09-19 DIAGNOSIS — I10 ESSENTIAL (PRIMARY) HYPERTENSION: ICD-10-CM

## 2023-09-19 DIAGNOSIS — I35.0 NONRHEUMATIC AORTIC (VALVE) STENOSIS: ICD-10-CM

## 2023-11-15 ENCOUNTER — APPOINTMENT (OUTPATIENT)
Dept: PULMONOLOGY | Facility: CLINIC | Age: 68
End: 2023-11-15
Payer: MEDICARE

## 2023-11-15 VITALS
DIASTOLIC BLOOD PRESSURE: 74 MMHG | WEIGHT: 254 LBS | HEIGHT: 68 IN | HEART RATE: 73 BPM | SYSTOLIC BLOOD PRESSURE: 138 MMHG | BODY MASS INDEX: 38.49 KG/M2 | OXYGEN SATURATION: 96 %

## 2023-11-15 DIAGNOSIS — G47.33 OBSTRUCTIVE SLEEP APNEA (ADULT) (PEDIATRIC): ICD-10-CM

## 2023-11-15 DIAGNOSIS — J44.9 CHRONIC OBSTRUCTIVE PULMONARY DISEASE, UNSPECIFIED: ICD-10-CM

## 2023-11-15 DIAGNOSIS — J18.9 PNEUMONIA, UNSPECIFIED ORGANISM: ICD-10-CM

## 2023-11-15 PROBLEM — E11.9 TYPE 2 DIABETES MELLITUS WITHOUT COMPLICATIONS: Chronic | Status: ACTIVE | Noted: 2023-09-10

## 2023-11-15 PROBLEM — I10 ESSENTIAL (PRIMARY) HYPERTENSION: Chronic | Status: ACTIVE | Noted: 2023-09-10

## 2023-11-15 PROBLEM — U07.1 COVID-19: Chronic | Status: ACTIVE | Noted: 2023-09-10

## 2023-11-15 PROBLEM — I25.10 ATHEROSCLEROTIC HEART DISEASE OF NATIVE CORONARY ARTERY WITHOUT ANGINA PECTORIS: Chronic | Status: ACTIVE | Noted: 2023-09-10

## 2023-11-15 PROBLEM — J44.1 CHRONIC OBSTRUCTIVE PULMONARY DISEASE WITH (ACUTE) EXACERBATION: Chronic | Status: ACTIVE | Noted: 2023-09-10

## 2023-11-15 PROCEDURE — 99213 OFFICE O/P EST LOW 20 MIN: CPT

## 2024-05-17 ENCOUNTER — APPOINTMENT (OUTPATIENT)
Dept: PULMONOLOGY | Facility: CLINIC | Age: 69
End: 2024-05-17

## 2024-07-30 ENCOUNTER — EMERGENCY (EMERGENCY)
Facility: HOSPITAL | Age: 69
LOS: 0 days | Discharge: HOME | End: 2024-07-30
Attending: EMERGENCY MEDICINE
Payer: MEDICARE

## 2024-07-30 VITALS
HEART RATE: 62 BPM | TEMPERATURE: 98 F | DIASTOLIC BLOOD PRESSURE: 64 MMHG | WEIGHT: 255.96 LBS | HEIGHT: 68 IN | OXYGEN SATURATION: 98 % | RESPIRATION RATE: 18 BRPM | SYSTOLIC BLOOD PRESSURE: 146 MMHG

## 2024-07-30 DIAGNOSIS — Z79.82 LONG TERM (CURRENT) USE OF ASPIRIN: ICD-10-CM

## 2024-07-30 DIAGNOSIS — J44.9 CHRONIC OBSTRUCTIVE PULMONARY DISEASE, UNSPECIFIED: ICD-10-CM

## 2024-07-30 DIAGNOSIS — Z95.5 PRESENCE OF CORONARY ANGIOPLASTY IMPLANT AND GRAFT: ICD-10-CM

## 2024-07-30 DIAGNOSIS — I87.8 OTHER SPECIFIED DISORDERS OF VEINS: ICD-10-CM

## 2024-07-30 DIAGNOSIS — I10 ESSENTIAL (PRIMARY) HYPERTENSION: ICD-10-CM

## 2024-07-30 DIAGNOSIS — I25.10 ATHEROSCLEROTIC HEART DISEASE OF NATIVE CORONARY ARTERY WITHOUT ANGINA PECTORIS: ICD-10-CM

## 2024-07-30 DIAGNOSIS — E11.9 TYPE 2 DIABETES MELLITUS WITHOUT COMPLICATIONS: ICD-10-CM

## 2024-07-30 PROCEDURE — 99212 OFFICE O/P EST SF 10 MIN: CPT

## 2024-07-30 PROCEDURE — 99283 EMERGENCY DEPT VISIT LOW MDM: CPT

## 2024-07-30 NOTE — ED PROVIDER NOTE - CARE PROVIDER_API CALL
Valentin,   your Vascular Surgeon Dr. Hanson at [facility]  as instructed  Phone: (   )    -  Fax: (   )    -  Established Patient  Follow Up Time: Routine

## 2024-07-30 NOTE — ED PROVIDER NOTE - PROVIDER TOKENS
FREE:[LAST:[Valentin],PHONE:[(   )    -],FAX:[(   )    -],ADDRESS:[your Vascular Surgeon Dr. Hanson at [facility]  as instructed],FOLLOWUP:[Routine],ESTABLISHEDPATIENT:[T]]

## 2024-07-30 NOTE — ED PROVIDER NOTE - PHYSICAL EXAMINATION
elderly M nad  skin warm, dry  ncat  neck supple  rrr nl s1s2 no mrg  ctab no wrr  abd soft ntnd no palpable masses no rgr  back non-tender no cvat  ext- LEs chronic venous stasis changes bl, bandages removed from RLE, no active bleeding to site of varicose ablation over post distal calf, nvid, dpi; remainder of ext exam nl  neuro aaox3 grossly nf exam

## 2024-07-30 NOTE — ED PROVIDER NOTE - CLINICAL SUMMARY MEDICAL DECISION MAKING FREE TEXT BOX
Labs, EKG and imaging were ordered, where indicated.  Independent interpretation of any labs, EKG & imaging that was ordered was performed by me, Dr. Smith. Appropriate medications for patient's presenting complaints were ordered and effects were reassessed, where indicated.  Patient's records (prior hospital, ED visit, and/or nursing home note) were reviewed, if available.  Additional history was obtained from EMS, family, and/or PCP (where available).  Escalation to admission/observation was considered.  However patient feels much better and patient/parent is comfortable with discharge.  Appropriate follow-up was arranged.     wound chk s/p RLE varicose vein ablation yest - bandages removed, site c/d/i, no active bleeding - strict return precautions discussed, rec outpt Vasc f/u with private surgeon

## 2024-07-30 NOTE — ED PROVIDER NOTE - PATIENT PORTAL LINK FT
You can access the FollowMyHealth Patient Portal offered by [facility] by registering at the following website: [email or web address]. By joining [facility]’s FollowMyHealth portal, you will also be able to view your health information using other applications (apps) compatible with our system.

## 2024-07-30 NOTE — ED ADULT NURSE NOTE - NSFALLUNIVINTERV_ED_ALL_ED
Bed/Stretcher in lowest position, wheels locked, appropriate side rails in place/Call bell, personal items and telephone in reach/Instruct patient to call for assistance before getting out of bed/chair/stretcher/Non-slip footwear applied when patient is off stretcher/[place] to call system/Physically safe environment - no spills, clutter or unnecessary equipment/Purposeful proactive rounding/Room/bathroom lighting operational, light cord in reach

## 2024-07-30 NOTE — ED ADULT TRIAGE NOTE - CHIEF COMPLAINT QUOTE
had procedure for varicose vein yesterday to right lower leg  was told to take bandages off, but unable to get bandage off wound because gauze is stuck on wound

## 2024-07-30 NOTE — ED PROVIDER NOTE - OBJECTIVE STATEMENT
69-year-old male PMH DM, HTN, COPD, HL, CAD/stents on prasugrel and baby aspirin daily, s/p right lower extremity posterior venous ablation yesterday presenting for wound check.  Patient states he was advised to remove his bandages today, however wife noticed some dried blood to bandage and was afraid to remove it on her own.  No other complaints.  Patient denies any dizziness, CP/SOB, focal numbness or weakness.

## 2024-08-27 ENCOUNTER — RX RENEWAL (OUTPATIENT)
Age: 69
End: 2024-08-27

## 2024-10-04 ENCOUNTER — APPOINTMENT (OUTPATIENT)
Dept: PULMONOLOGY | Facility: CLINIC | Age: 69
End: 2024-10-04
Payer: MEDICARE

## 2024-10-04 VITALS
WEIGHT: 249 LBS | SYSTOLIC BLOOD PRESSURE: 120 MMHG | HEART RATE: 68 BPM | OXYGEN SATURATION: 96 % | BODY MASS INDEX: 37.74 KG/M2 | RESPIRATION RATE: 14 BRPM | HEIGHT: 68 IN | DIASTOLIC BLOOD PRESSURE: 70 MMHG

## 2024-10-04 DIAGNOSIS — G47.33 OBSTRUCTIVE SLEEP APNEA (ADULT) (PEDIATRIC): ICD-10-CM

## 2024-10-04 DIAGNOSIS — J44.9 CHRONIC OBSTRUCTIVE PULMONARY DISEASE, UNSPECIFIED: ICD-10-CM

## 2024-10-04 PROCEDURE — G2211 COMPLEX E/M VISIT ADD ON: CPT

## 2024-10-04 PROCEDURE — 99213 OFFICE O/P EST LOW 20 MIN: CPT

## 2024-10-04 NOTE — DISCUSSION/SUMMARY
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

## 2024-10-28 ENCOUNTER — NON-APPOINTMENT (OUTPATIENT)
Age: 69
End: 2024-10-28

## 2025-04-11 ENCOUNTER — APPOINTMENT (OUTPATIENT)
Dept: PULMONOLOGY | Facility: CLINIC | Age: 70
End: 2025-04-11

## 2025-05-30 ENCOUNTER — APPOINTMENT (OUTPATIENT)
Dept: PULMONOLOGY | Facility: CLINIC | Age: 70
End: 2025-05-30